# Patient Record
Sex: FEMALE | Race: WHITE | Employment: OTHER | URBAN - METROPOLITAN AREA
[De-identification: names, ages, dates, MRNs, and addresses within clinical notes are randomized per-mention and may not be internally consistent; named-entity substitution may affect disease eponyms.]

---

## 2021-02-17 NOTE — H&P
Thania Mukherjee is a 62year old female. HPI:   Patient presents with:  Physical: Hx partial hysterectomy 2007. Cardiac ablation in 2001. Establish Care: Previous PCP Dr. Tisha Prince at [facility]. Seens Derm yearly Dr. Baylee Jiang.      Ms. Anat Alfaro is a 61 yea • Obesity Mother    • Hypertension Sister    • Diabetes Sister    • Obesity Sister    • Cancer Maternal Grandfather         Basal cell carcinoma   • Heart Disorder Maternal Grandfather    • Other (Other) Sister         RA and SLE      Social History: Soc acute distress. Alert and oriented x 3.   Eyes: EOMI, PERRLA, clear sclera b/l  HENT: NCAT, Moist mucous membranes, Oropharynx without erythema or exudates  Neck: No JVD, no thyromegaly  Cardiovascular: S1, S2, no S3, no S4, Regular rate and rhythm, No murm steroid   use. Today’s DEXA justifies labelling the patient as having Normal Bone Density   BMD (based on the T score result(s) for the femoral neck, lumbar spine and   total hip.     Using the patient Questionnaire and the FRAX web site, the calculated the defined types were placed in this encounter.       Meds This Visit:  Requested Prescriptions      No prescriptions requested or ordered in this encounter       Imaging & Referrals:  None       Health Maintenance  HTN Screen: At goal  DM Screen: Check fa

## 2021-02-17 NOTE — PATIENT INSTRUCTIONS
- You were seen in clinic for regular annual check-up. We have reviewed the last set of blood work at [facility].   No need to repeat any blood work until later this year (September 2021)  -Please obtain the records of your last colonoscopy and send them through

## 2021-02-22 ENCOUNTER — OFFICE VISIT (OUTPATIENT)
Dept: INTERNAL MEDICINE CLINIC | Facility: CLINIC | Age: 61
End: 2021-02-22
Payer: COMMERCIAL

## 2021-02-22 VITALS
TEMPERATURE: 98 F | SYSTOLIC BLOOD PRESSURE: 118 MMHG | HEART RATE: 76 BPM | WEIGHT: 153 LBS | OXYGEN SATURATION: 98 % | DIASTOLIC BLOOD PRESSURE: 82 MMHG | HEIGHT: 63.8 IN | BODY MASS INDEX: 26.44 KG/M2

## 2021-02-22 DIAGNOSIS — D17.21 LIPOMA OF RIGHT UPPER EXTREMITY: ICD-10-CM

## 2021-02-22 DIAGNOSIS — Z80.8 FAMILY HISTORY OF SKIN CANCER: ICD-10-CM

## 2021-02-22 DIAGNOSIS — Z00.00 ROUTINE GENERAL MEDICAL EXAMINATION AT A HEALTH CARE FACILITY: Primary | ICD-10-CM

## 2021-02-22 PROCEDURE — 3074F SYST BP LT 130 MM HG: CPT | Performed by: INTERNAL MEDICINE

## 2021-02-22 PROCEDURE — 3079F DIAST BP 80-89 MM HG: CPT | Performed by: INTERNAL MEDICINE

## 2021-02-22 PROCEDURE — 99386 PREV VISIT NEW AGE 40-64: CPT | Performed by: INTERNAL MEDICINE

## 2021-02-22 PROCEDURE — 3008F BODY MASS INDEX DOCD: CPT | Performed by: INTERNAL MEDICINE

## 2021-03-02 ENCOUNTER — PATIENT MESSAGE (OUTPATIENT)
Dept: INTERNAL MEDICINE CLINIC | Facility: CLINIC | Age: 61
End: 2021-03-02

## 2021-03-02 NOTE — TELEPHONE ENCOUNTER
[employer] message responded, I reviewed the colonoscopy report from 2016. In my out box for scanning to the chart.

## 2021-03-02 NOTE — TELEPHONE ENCOUNTER
From: Estefani Diamond  To: Mildred Sharma MD  Sent: 3/2/2021 10:38 AM CST  Subject: Other    Good Morning! I attached the colonoscopy results that you had asked I send to you. Have a good day!   Carlos Duarte

## 2021-11-07 NOTE — PATIENT INSTRUCTIONS
You were seen in clinic for follow-up. Today, we focused on general checkup. We are happy to hear you are doing well since our last visit. Please make an appointment with dermatology, Dr. Ovi Campos. Continue your annual skin checks with dermatology.     P

## 2021-11-07 NOTE — PROGRESS NOTES
Chief Complaint:   Patient presents with:  Checkup: flu shot given today, pt requesting order for screening mammo and referral to dermatologist for annual skin exam.        HPI:     Ms. Durga Hawley is a 62year old female PMHX SVT, family history of skin cancer BOLD    Constitutional: Fever, Chills, Weight Gain, Weight Loss, Night Sweats, Fatigue, Malaise  ENT/Mouth:  Hearing Changes, Ear Pain, Nasal Congestion, Sinus Pain, Hoarseness, Sore throat, Rhinorrhea, Swallowing Difficulty  Eyes: Eye Pain, Swelling, Redn tenderness bilaterally  Neurologic: No focal neurological deficits, CN II-XII intact, light touch intact, MSK Strength 5/5 and symmetric in all extremities, normal gait  Musculoskeletal: Full range of motion of all extremities, no clubbing/swelling/edema AND PLAN:       Ms. Saira Rucker is a 62year old female PMHX SVT, family history of skin cancer who presents today for follow-up.     History of SVT  Ablation 2001 without recurrence.   Remains asymptomatic  –Continue to monitor     Family history of skin cancer

## 2021-11-08 ENCOUNTER — OFFICE VISIT (OUTPATIENT)
Dept: INTERNAL MEDICINE CLINIC | Facility: CLINIC | Age: 61
End: 2021-11-08
Payer: COMMERCIAL

## 2021-11-08 VITALS
OXYGEN SATURATION: 98 % | TEMPERATURE: 98 F | HEIGHT: 63.8 IN | HEART RATE: 66 BPM | WEIGHT: 154 LBS | SYSTOLIC BLOOD PRESSURE: 126 MMHG | BODY MASS INDEX: 26.62 KG/M2 | DIASTOLIC BLOOD PRESSURE: 66 MMHG

## 2021-11-08 DIAGNOSIS — Z12.31 ENCOUNTER FOR SCREENING MAMMOGRAM FOR MALIGNANT NEOPLASM OF BREAST: Primary | ICD-10-CM

## 2021-11-08 DIAGNOSIS — Z80.8 FAMILY HISTORY OF SKIN CANCER: ICD-10-CM

## 2021-11-08 PROCEDURE — 90471 IMMUNIZATION ADMIN: CPT | Performed by: INTERNAL MEDICINE

## 2021-11-08 PROCEDURE — 99213 OFFICE O/P EST LOW 20 MIN: CPT | Performed by: INTERNAL MEDICINE

## 2021-11-08 PROCEDURE — 3078F DIAST BP <80 MM HG: CPT | Performed by: INTERNAL MEDICINE

## 2021-11-08 PROCEDURE — 3074F SYST BP LT 130 MM HG: CPT | Performed by: INTERNAL MEDICINE

## 2021-11-08 PROCEDURE — 90686 IIV4 VACC NO PRSV 0.5 ML IM: CPT | Performed by: INTERNAL MEDICINE

## 2021-11-08 PROCEDURE — 3008F BODY MASS INDEX DOCD: CPT | Performed by: INTERNAL MEDICINE

## 2021-11-09 ENCOUNTER — PATIENT MESSAGE (OUTPATIENT)
Dept: INTERNAL MEDICINE CLINIC | Facility: CLINIC | Age: 61
End: 2021-11-09

## 2021-11-09 PROBLEM — D17.21 LIPOMA OF RIGHT UPPER EXTREMITY: Status: RESOLVED | Noted: 2021-02-22 | Resolved: 2021-11-09

## 2021-11-09 NOTE — TELEPHONE ENCOUNTER
From: Jim Yao  To: Hua Martino MD  Sent: 11/9/2021 3:06 PM CST  Subject: Error in chart    I noticed an error on the After Visit Summary and Dermatology Referral. It states I have a lipoma of the right upper extremity.  Also states it was one of the
Office visit and referral addended per request.  [address] message sent.
To Dr. Arlen Watkins to please advise----
temporal

## 2021-11-09 NOTE — TELEPHONE ENCOUNTER
From: Dorota Mccrary  Sent: 11/9/2021 4:40 PM CST  To: [facility] Clinical Staff  Subject: Error in chart    Thank you!

## 2022-01-12 ENCOUNTER — HOSPITAL ENCOUNTER (OUTPATIENT)
Dept: MAMMOGRAPHY | Facility: HOSPITAL | Age: 62
Discharge: HOME | End: 2022-01-12
Attending: INTERNAL MEDICINE
Payer: COMMERCIAL

## 2022-01-12 ENCOUNTER — TELEPHONE (OUTPATIENT)
Dept: INTERNAL MEDICINE CLINIC | Facility: CLINIC | Age: 62
End: 2022-01-12

## 2022-01-12 DIAGNOSIS — Z12.31 ENCOUNTER FOR SCREENING MAMMOGRAM FOR MALIGNANT NEOPLASM OF BREAST: ICD-10-CM

## 2022-01-12 PROCEDURE — 77067 SCR MAMMO BI INCL CAD: CPT | Performed by: INTERNAL MEDICINE

## 2022-01-12 PROCEDURE — 77063 BREAST TOMOSYNTHESIS BI: CPT | Performed by: INTERNAL MEDICINE

## 2022-01-12 NOTE — TELEPHONE ENCOUNTER
Sent a [employer] message regarding the mammogram results. Repeat mammogram in 1 year with clinical breast exam.    Qpynt message sent.

## 2022-02-21 ENCOUNTER — OFFICE VISIT (OUTPATIENT)
Dept: DERMATOLOGY CLINIC | Facility: CLINIC | Age: 62
End: 2022-02-21
Payer: COMMERCIAL

## 2022-02-21 DIAGNOSIS — D23.30 BENIGN NEOPLASM OF SKIN OF FACE: ICD-10-CM

## 2022-02-21 DIAGNOSIS — L57.0 AK (ACTINIC KERATOSIS): Primary | ICD-10-CM

## 2022-02-21 DIAGNOSIS — D23.60 BENIGN NEOPLASM OF SKIN OF UPPER LIMB, INCLUDING SHOULDER, UNSPECIFIED LATERALITY: ICD-10-CM

## 2022-02-21 DIAGNOSIS — D23.4 BENIGN NEOPLASM OF SCALP AND SKIN OF NECK: ICD-10-CM

## 2022-02-21 DIAGNOSIS — D23.70 BENIGN NEOPLASM OF SKIN OF LOWER LIMB, INCLUDING HIP, UNSPECIFIED LATERALITY: ICD-10-CM

## 2022-02-21 DIAGNOSIS — Z12.83 SKIN CANCER SCREENING: ICD-10-CM

## 2022-02-21 DIAGNOSIS — D23.5 BENIGN NEOPLASM OF SKIN OF TRUNK, EXCEPT SCROTUM: ICD-10-CM

## 2022-02-21 DIAGNOSIS — L81.4 LENTIGO: ICD-10-CM

## 2022-02-21 PROCEDURE — 99203 OFFICE O/P NEW LOW 30 MIN: CPT | Performed by: DERMATOLOGY

## 2022-02-21 PROCEDURE — 17003 DESTRUCT PREMALG LES 2-14: CPT | Performed by: DERMATOLOGY

## 2022-02-21 PROCEDURE — 17000 DESTRUCT PREMALG LESION: CPT | Performed by: DERMATOLOGY

## 2022-12-30 ENCOUNTER — TELEPHONE (OUTPATIENT)
Dept: INTERNAL MEDICINE CLINIC | Facility: CLINIC | Age: 62
End: 2022-12-30

## 2022-12-30 DIAGNOSIS — Z12.31 ENCOUNTER FOR SCREENING MAMMOGRAM FOR MALIGNANT NEOPLASM OF BREAST: Primary | ICD-10-CM

## 2022-12-30 NOTE — TELEPHONE ENCOUNTER
Pt requesting order for mammogram  Please notify patient via [email or web address] when order entered  Tasked to nursing

## 2023-01-15 NOTE — PATIENT INSTRUCTIONS
- You were seen in clinic for regular annual check-up. We have ordered labs for you and we will call you with the results. Please obtain the bloodwork fasting for 12 hours. OK to drink water the day of your blood draw  -We will follow-up on the results of your mammogram later this month, continue with home self checks  - Continue with periodic checking of your skin with family history of skin conditions. We will refer you to Dr. Rubi Kent of Dermatology for 1-2 whole body skin checks  -Your next colonoscopy will be 2025  - Your next Pneumonia shot/Prevnar 21 will be at age 73  - Please continue to eat a varied diet including recommended servings of vegetables, fruits, and low fat dairy. Minimize high saturated fats (such as fast foods) and high sugar intake (such as soda)  - We recommend 150 minutes of moderate intensity exercise (brisk walking, swimming) weekly to maintain your current weight. Targeted weight loss will require more vigorous exercise or more than 150 minutes/week. Return to clinic in 6 months for follow-up or 1 year for annual physical examination    Keep up the good work with nutrition, exercise, stress coping mechanisms. Lets maintain this excellent state of health moving forward and staying up-to-date with your health maintenance recommendations. Happy birthday this upcoming weekend!

## 2023-01-17 ENCOUNTER — OFFICE VISIT (OUTPATIENT)
Dept: INTERNAL MEDICINE CLINIC | Facility: CLINIC | Age: 63
End: 2023-01-17

## 2023-01-17 ENCOUNTER — LAB ENCOUNTER (OUTPATIENT)
Dept: LAB | Age: 63
End: 2023-01-17
Attending: INTERNAL MEDICINE
Payer: COMMERCIAL

## 2023-01-17 VITALS
DIASTOLIC BLOOD PRESSURE: 80 MMHG | HEART RATE: 68 BPM | OXYGEN SATURATION: 96 % | TEMPERATURE: 98 F | HEIGHT: 63 IN | BODY MASS INDEX: 28.35 KG/M2 | WEIGHT: 160 LBS | SYSTOLIC BLOOD PRESSURE: 118 MMHG

## 2023-01-17 DIAGNOSIS — I47.1 SVT (SUPRAVENTRICULAR TACHYCARDIA) (HCC): ICD-10-CM

## 2023-01-17 DIAGNOSIS — E55.9 VITAMIN D DEFICIENCY: ICD-10-CM

## 2023-01-17 DIAGNOSIS — Z13.1 SCREENING FOR DIABETES MELLITUS: ICD-10-CM

## 2023-01-17 DIAGNOSIS — Z00.00 ANNUAL PHYSICAL EXAM: Primary | ICD-10-CM

## 2023-01-17 DIAGNOSIS — Z13.220 SCREENING FOR LIPOID DISORDERS: ICD-10-CM

## 2023-01-17 DIAGNOSIS — Z13.0 SCREENING FOR DEFICIENCY ANEMIA: ICD-10-CM

## 2023-01-17 DIAGNOSIS — Z80.8 FAMILY HISTORY OF SKIN CANCER: ICD-10-CM

## 2023-01-17 DIAGNOSIS — Z13.29 SCREENING FOR THYROID DISORDER: ICD-10-CM

## 2023-01-17 LAB
ALBUMIN SERPL-MCNC: 3.9 G/DL (ref 3.4–5)
ALBUMIN/GLOB SERPL: 1.3 {RATIO} (ref 1–2)
ALP LIVER SERPL-CCNC: 52 U/L
ALT SERPL-CCNC: 22 U/L
ANION GAP SERPL CALC-SCNC: 2 MMOL/L (ref 0–18)
AST SERPL-CCNC: 14 U/L (ref 15–37)
BASOPHILS # BLD AUTO: 0.02 X10(3) UL (ref 0–0.2)
BASOPHILS NFR BLD AUTO: 0.6 %
BILIRUB SERPL-MCNC: 0.5 MG/DL (ref 0.1–2)
BUN BLD-MCNC: 9 MG/DL (ref 7–18)
BUN/CREAT SERPL: 12.3 (ref 10–20)
CALCIUM BLD-MCNC: 9.1 MG/DL (ref 8.5–10.1)
CHLORIDE SERPL-SCNC: 104 MMOL/L (ref 98–112)
CHOLEST SERPL-MCNC: 186 MG/DL (ref ?–200)
CO2 SERPL-SCNC: 31 MMOL/L (ref 21–32)
CREAT BLD-MCNC: 0.73 MG/DL
DEPRECATED RDW RBC AUTO: 43.3 FL (ref 35.1–46.3)
EOSINOPHIL # BLD AUTO: 0.01 X10(3) UL (ref 0–0.7)
EOSINOPHIL NFR BLD AUTO: 0.3 %
ERYTHROCYTE [DISTWIDTH] IN BLOOD BY AUTOMATED COUNT: 11.9 % (ref 11–15)
FASTING PATIENT LIPID ANSWER: YES
FASTING STATUS PATIENT QL REPORTED: YES
GFR SERPLBLD BASED ON 1.73 SQ M-ARVRAT: 94 ML/MIN/1.73M2 (ref 60–?)
GLOBULIN PLAS-MCNC: 3 G/DL (ref 2.8–4.4)
GLUCOSE BLD-MCNC: 93 MG/DL (ref 70–99)
HCT VFR BLD AUTO: 41 %
HDLC SERPL-MCNC: 106 MG/DL (ref 40–59)
HGB BLD-MCNC: 13.6 G/DL
IMM GRANULOCYTES # BLD AUTO: 0 X10(3) UL (ref 0–1)
IMM GRANULOCYTES NFR BLD: 0 %
LDLC SERPL CALC-MCNC: 69 MG/DL (ref ?–100)
LYMPHOCYTES # BLD AUTO: 0.94 X10(3) UL (ref 1–4)
LYMPHOCYTES NFR BLD AUTO: 28.2 %
MCH RBC QN AUTO: 32.9 PG (ref 26–34)
MCHC RBC AUTO-ENTMCNC: 33.2 G/DL (ref 31–37)
MCV RBC AUTO: 99.3 FL
MONOCYTES # BLD AUTO: 0.32 X10(3) UL (ref 0.1–1)
MONOCYTES NFR BLD AUTO: 9.6 %
NEUTROPHILS # BLD AUTO: 2.04 X10 (3) UL (ref 1.5–7.7)
NEUTROPHILS # BLD AUTO: 2.04 X10(3) UL (ref 1.5–7.7)
NEUTROPHILS NFR BLD AUTO: 61.3 %
NONHDLC SERPL-MCNC: 80 MG/DL (ref ?–130)
OSMOLALITY SERPL CALC.SUM OF ELEC: 282 MOSM/KG (ref 275–295)
PLATELET # BLD AUTO: 232 10(3)UL (ref 150–450)
POTASSIUM SERPL-SCNC: 4.6 MMOL/L (ref 3.5–5.1)
PROT SERPL-MCNC: 6.9 G/DL (ref 6.4–8.2)
RBC # BLD AUTO: 4.13 X10(6)UL
SODIUM SERPL-SCNC: 137 MMOL/L (ref 136–145)
TRIGL SERPL-MCNC: 57 MG/DL (ref 30–149)
TSI SER-ACNC: 1.29 MIU/ML (ref 0.36–3.74)
VIT D+METAB SERPL-MCNC: 24.6 NG/ML (ref 30–100)
VLDLC SERPL CALC-MCNC: 9 MG/DL (ref 0–30)
WBC # BLD AUTO: 3.3 X10(3) UL (ref 4–11)

## 2023-01-17 PROCEDURE — 82306 VITAMIN D 25 HYDROXY: CPT

## 2023-01-17 PROCEDURE — 3074F SYST BP LT 130 MM HG: CPT | Performed by: INTERNAL MEDICINE

## 2023-01-17 PROCEDURE — 80061 LIPID PANEL: CPT | Performed by: INTERNAL MEDICINE

## 2023-01-17 PROCEDURE — 3079F DIAST BP 80-89 MM HG: CPT | Performed by: INTERNAL MEDICINE

## 2023-01-17 PROCEDURE — 90715 TDAP VACCINE 7 YRS/> IM: CPT | Performed by: INTERNAL MEDICINE

## 2023-01-17 PROCEDURE — 85025 COMPLETE CBC W/AUTO DIFF WBC: CPT | Performed by: INTERNAL MEDICINE

## 2023-01-17 PROCEDURE — 3008F BODY MASS INDEX DOCD: CPT | Performed by: INTERNAL MEDICINE

## 2023-01-17 PROCEDURE — 84443 ASSAY THYROID STIM HORMONE: CPT | Performed by: INTERNAL MEDICINE

## 2023-01-17 PROCEDURE — 90471 IMMUNIZATION ADMIN: CPT | Performed by: INTERNAL MEDICINE

## 2023-01-17 PROCEDURE — 99396 PREV VISIT EST AGE 40-64: CPT | Performed by: INTERNAL MEDICINE

## 2023-01-17 PROCEDURE — 80053 COMPREHEN METABOLIC PANEL: CPT | Performed by: INTERNAL MEDICINE

## 2023-01-17 PROCEDURE — 36415 COLL VENOUS BLD VENIPUNCTURE: CPT | Performed by: INTERNAL MEDICINE

## 2023-01-19 ENCOUNTER — TELEPHONE (OUTPATIENT)
Dept: INTERNAL MEDICINE CLINIC | Facility: CLINIC | Age: 63
End: 2023-01-19

## 2023-01-19 NOTE — TELEPHONE ENCOUNTER
Please notify patient that I reviewed the blood work from 1/17    Labs  Cholesterol is well controlled  Vitamin D is slightly low 24.6  All other lab tests look good    Recommendations  We will follow-up on the mammogram once completed next week  Would recommend vitamin D supplementation vitamin D2 over-the-counter 2000 units once a day  No other recommendations for now, keep up the good work with nutrition and exercise

## 2023-01-24 ENCOUNTER — PATIENT MESSAGE (OUTPATIENT)
Dept: INTERNAL MEDICINE CLINIC | Facility: CLINIC | Age: 63
End: 2023-01-24

## 2023-01-24 DIAGNOSIS — Z80.8 FAMILY HISTORY OF MELANOMA: Primary | ICD-10-CM

## 2023-01-24 NOTE — TELEPHONE ENCOUNTER
From: Randy July  To: Armin Slater MD  Sent: 1/24/2023 12:14 PM CST  Subject: Derm Referral Change Needed    Hello! I need to have my referral to Dermatology changed because Dr. Aurora Moctezuma no longer takes my insurance. I will go back to Dr. Kym Fulton. I checked with [facility] Shell and she is still in network. Please place a new referral for me. Thank you!

## 2023-01-27 ENCOUNTER — PATIENT MESSAGE (OUTPATIENT)
Dept: INTERNAL MEDICINE CLINIC | Facility: CLINIC | Age: 63
End: 2023-01-27

## 2023-01-28 ENCOUNTER — HOSPITAL ENCOUNTER (OUTPATIENT)
Dept: MAMMOGRAPHY | Facility: HOSPITAL | Age: 63
Discharge: HOME | End: 2023-01-28
Attending: INTERNAL MEDICINE
Payer: COMMERCIAL

## 2023-01-28 DIAGNOSIS — Z12.31 ENCOUNTER FOR SCREENING MAMMOGRAM FOR MALIGNANT NEOPLASM OF BREAST: ICD-10-CM

## 2023-01-28 PROCEDURE — 77067 SCR MAMMO BI INCL CAD: CPT | Performed by: INTERNAL MEDICINE

## 2023-01-28 PROCEDURE — 77063 BREAST TOMOSYNTHESIS BI: CPT | Performed by: INTERNAL MEDICINE

## 2023-03-08 ENCOUNTER — OFFICE VISIT (OUTPATIENT)
Dept: DERMATOLOGY CLINIC | Facility: CLINIC | Age: 63
End: 2023-03-08

## 2023-03-08 DIAGNOSIS — D23.5 BENIGN NEOPLASM OF SKIN OF TRUNK, EXCEPT SCROTUM: ICD-10-CM

## 2023-03-08 DIAGNOSIS — L81.4 LENTIGO: ICD-10-CM

## 2023-03-08 DIAGNOSIS — D23.70 BENIGN NEOPLASM OF SKIN OF LOWER LIMB, INCLUDING HIP, UNSPECIFIED LATERALITY: ICD-10-CM

## 2023-03-08 DIAGNOSIS — Z12.83 SKIN CANCER SCREENING: ICD-10-CM

## 2023-03-08 DIAGNOSIS — D23.60 BENIGN NEOPLASM OF SKIN OF UPPER LIMB, INCLUDING SHOULDER, UNSPECIFIED LATERALITY: ICD-10-CM

## 2023-03-08 DIAGNOSIS — D23.4 BENIGN NEOPLASM OF SCALP AND SKIN OF NECK: ICD-10-CM

## 2023-03-08 DIAGNOSIS — D23.5 BENIGN NEOPLASM OF SKIN OF TRUNK: ICD-10-CM

## 2023-03-08 DIAGNOSIS — L57.0 AK (ACTINIC KERATOSIS): Primary | ICD-10-CM

## 2023-03-08 DIAGNOSIS — D23.30 BENIGN NEOPLASM OF SKIN OF FACE: ICD-10-CM

## 2023-03-08 RX ORDER — MULTIVIT-MIN/IRON/FOLIC ACID/K 18-600-40
CAPSULE ORAL
COMMUNITY

## 2023-08-28 ENCOUNTER — OFFICE VISIT (OUTPATIENT)
Dept: DERMATOLOGY CLINIC | Facility: CLINIC | Age: 63
End: 2023-08-28

## 2023-08-28 DIAGNOSIS — L57.0 AK (ACTINIC KERATOSIS): ICD-10-CM

## 2023-08-28 DIAGNOSIS — D23.60 BENIGN NEOPLASM OF SKIN OF UPPER LIMB, INCLUDING SHOULDER, UNSPECIFIED LATERALITY: ICD-10-CM

## 2023-08-28 DIAGNOSIS — D48.5 NEOPLASM OF UNCERTAIN BEHAVIOR OF SKIN: Primary | ICD-10-CM

## 2023-08-28 DIAGNOSIS — D23.30 BENIGN NEOPLASM OF SKIN OF FACE: ICD-10-CM

## 2023-08-28 DIAGNOSIS — D23.4 BENIGN NEOPLASM OF SCALP AND SKIN OF NECK: ICD-10-CM

## 2023-08-28 DIAGNOSIS — D23.70 BENIGN NEOPLASM OF SKIN OF LOWER LIMB, INCLUDING HIP, UNSPECIFIED LATERALITY: ICD-10-CM

## 2023-08-28 DIAGNOSIS — L81.4 LENTIGO: ICD-10-CM

## 2023-08-28 DIAGNOSIS — D23.5 BENIGN NEOPLASM OF SKIN OF TRUNK: ICD-10-CM

## 2023-08-28 PROCEDURE — 88305 TISSUE EXAM BY PATHOLOGIST: CPT | Performed by: DERMATOLOGY

## 2023-08-31 ENCOUNTER — PATIENT MESSAGE (OUTPATIENT)
Dept: DERMATOLOGY CLINIC | Facility: CLINIC | Age: 63
End: 2023-08-31

## 2023-08-31 ENCOUNTER — TELEPHONE (OUTPATIENT)
Dept: DERMATOLOGY CLINIC | Facility: CLINIC | Age: 63
End: 2023-08-31

## 2023-08-31 ENCOUNTER — TELEPHONE (OUTPATIENT)
Dept: INTERNAL MEDICINE CLINIC | Facility: CLINIC | Age: 63
End: 2023-08-31

## 2023-08-31 DIAGNOSIS — C44.92 SCC (SQUAMOUS CELL CARCINOMA): Primary | ICD-10-CM

## 2023-08-31 DIAGNOSIS — D04.9 SQUAMOUS CELL CARCINOMA IN SITU (SCCIS) OF SKIN: Primary | ICD-10-CM

## 2023-09-05 ENCOUNTER — PATIENT MESSAGE (OUTPATIENT)
Dept: INTERNAL MEDICINE CLINIC | Facility: CLINIC | Age: 63
End: 2023-09-05

## 2023-09-05 DIAGNOSIS — C44.729 SQUAMOUS CELL CARCINOMA OF SKIN OF LEFT LOWER LIMB, INCLUDING HIP: Primary | ICD-10-CM

## 2023-09-05 NOTE — TELEPHONE ENCOUNTER
To managed care (Grisel Jarrett): can below CPT codes and Dx code be added to existing referral placed 8/31/23?    referral # [id number]

## 2023-09-05 NOTE — TELEPHONE ENCOUNTER
From: Awa Davenport  To: Harpal Fowler MD  Sent: 9/5/2023 11:59 AM CDT  Subject: Referral to Dr. Tito Rivera additional info needed    Hello,    The referral to Dr. Tito Rivera needs the following diagnosis and CPT Codes added to it and then faxed to them at [phone]:    Diagnosis Code: C44.729    CPT Codes: [address], [address] [facility] [address] [facility], [address] [facility] [address], [id number], [address] [place] [address]    Thank you,  Richard Ahr

## 2023-09-07 NOTE — TELEPHONE ENCOUNTER
Hello     We get authorization for additional visits.  The specialist should obtain auth for anything additional.     Thank you,  [facility]  Referral specialist

## 2023-09-10 NOTE — TELEPHONE ENCOUNTER
Not sure how to proceed with this. Reordered referral for requested codes? Can we touch base with [facility] on this?     [employer] Message sent

## 2023-10-12 ENCOUNTER — PATIENT MESSAGE (OUTPATIENT)
Dept: INTERNAL MEDICINE CLINIC | Facility: CLINIC | Age: 63
End: 2023-10-12

## 2023-11-15 ENCOUNTER — MED REC SCAN ONLY (OUTPATIENT)
Dept: DERMATOLOGY CLINIC | Facility: CLINIC | Age: 63
End: 2023-11-15

## 2024-01-31 ENCOUNTER — TELEPHONE (OUTPATIENT)
Dept: INTERNAL MEDICINE CLINIC | Facility: CLINIC | Age: 64
End: 2024-01-31

## 2024-02-15 ENCOUNTER — TELEPHONE (OUTPATIENT)
Dept: INTERNAL MEDICINE CLINIC | Facility: CLINIC | Age: 64
End: 2024-02-15

## 2024-02-15 DIAGNOSIS — Z12.83 SKIN CANCER SCREENING: ICD-10-CM

## 2024-02-15 DIAGNOSIS — E55.9 VITAMIN D DEFICIENCY: ICD-10-CM

## 2024-02-15 DIAGNOSIS — C44.729 SQUAMOUS CELL CARCINOMA OF SKIN OF LEFT LOWER LIMB, INCLUDING HIP: ICD-10-CM

## 2024-02-15 DIAGNOSIS — Z12.31 ENCOUNTER FOR SCREENING MAMMOGRAM FOR MALIGNANT NEOPLASM OF BREAST: Primary | ICD-10-CM

## 2024-02-15 DIAGNOSIS — Z13.29 SCREENING FOR THYROID DISORDER: ICD-10-CM

## 2024-02-15 DIAGNOSIS — Z00.00 ANNUAL PHYSICAL EXAM: ICD-10-CM

## 2024-02-15 NOTE — TELEPHONE ENCOUNTER
To Dr. CHASE--please review/advise on pending screening mammogram, annual lab orders, and derm referral.

## 2024-02-15 NOTE — TELEPHONE ENCOUNTER
Spoke with patient to re schedule her 3/28 physical with Dr Langston   New appointment date is 5/17/24    Pt asking for mammogram order and referral to see dermatologist, Dr Mary Escobedo, for her annual check up     [facility]    Confirm to patient via my chart

## 2024-02-22 ENCOUNTER — HOSPITAL ENCOUNTER (OUTPATIENT)
Dept: MAMMOGRAPHY | Facility: HOSPITAL | Age: 64
Discharge: HOME | End: 2024-02-22
Attending: INTERNAL MEDICINE
Payer: COMMERCIAL

## 2024-02-22 DIAGNOSIS — Z12.31 ENCOUNTER FOR SCREENING MAMMOGRAM FOR MALIGNANT NEOPLASM OF BREAST: ICD-10-CM

## 2024-02-22 PROCEDURE — 77063 BREAST TOMOSYNTHESIS BI: CPT | Performed by: INTERNAL MEDICINE

## 2024-02-22 PROCEDURE — 77067 SCR MAMMO BI INCL CAD: CPT | Performed by: INTERNAL MEDICINE

## 2024-02-26 ENCOUNTER — TELEPHONE (OUTPATIENT)
Dept: INTERNAL MEDICINE CLINIC | Facility: CLINIC | Age: 64
End: 2024-02-26

## 2024-02-27 NOTE — TELEPHONE ENCOUNTER
Please notify the patient that her mammogram came back normal, repeat mammogram and breast exam recommended in 1 year.

## 2024-03-22 ENCOUNTER — OFFICE VISIT (OUTPATIENT)
Dept: DERMATOLOGY CLINIC | Facility: CLINIC | Age: 64
End: 2024-03-22
Payer: COMMERCIAL

## 2024-03-22 DIAGNOSIS — Z85.828 HISTORY OF NONMELANOMA SKIN CANCER: ICD-10-CM

## 2024-03-22 DIAGNOSIS — L82.1 SK (SEBORRHEIC KERATOSIS): Primary | ICD-10-CM

## 2024-03-22 DIAGNOSIS — L57.0 AK (ACTINIC KERATOSIS): ICD-10-CM

## 2024-03-22 DIAGNOSIS — L81.4 LENTIGO: ICD-10-CM

## 2024-03-22 DIAGNOSIS — Z08 ENCOUNTER FOR FOLLOW-UP SURVEILLANCE OF SKIN CANCER: ICD-10-CM

## 2024-03-22 DIAGNOSIS — D23.9 BENIGN NEOPLASM OF SKIN, UNSPECIFIED LOCATION: ICD-10-CM

## 2024-03-22 DIAGNOSIS — L72.0 MILIA: ICD-10-CM

## 2024-03-22 DIAGNOSIS — D22.9 MULTIPLE NEVI: ICD-10-CM

## 2024-03-22 DIAGNOSIS — Z85.828 ENCOUNTER FOR FOLLOW-UP SURVEILLANCE OF SKIN CANCER: ICD-10-CM

## 2024-03-22 PROCEDURE — 99213 OFFICE O/P EST LOW 20 MIN: CPT | Performed by: DERMATOLOGY

## 2024-03-22 RX ORDER — AMMONIUM LACTATE 12 G/100G
1 LOTION TOPICAL 2 TIMES DAILY
Qty: 225 ML | Refills: 11 | Status: SHIPPED | OUTPATIENT
Start: 2024-03-22 | End: 2024-04-21

## 2024-03-22 RX ORDER — ADAPALENE 45 G/G
GEL TOPICAL
Qty: 45 G | Refills: 1 | Status: SHIPPED | OUTPATIENT
Start: 2024-03-22

## 2024-04-01 NOTE — PROGRESS NOTES
Yajaira Harding is a 64 year old female.  HPI:     CC:    Chief Complaint   Patient presents with    Full Skin Exam     Patient present for a Full Body Check - LOV 08-28-23 - Patient has a hx of AK's and family hx of Melanoma and BCC - No new spots or areas of concern         Allergies:  Patient has no known allergies.    HISTORY:    Past Medical History:   Diagnosis Date    Paroxysmal SVT (supraventricular tachycardia) 2001    Clinically resolved, underwent cardiac ablation at [facility] in 2001.    SCC (squamous cell carcinoma) 2023    left posterior ankle      Past Surgical History:   Procedure Laterality Date    COLONOSCOPY      HYSTERECTOMY  2007    partial; cervix removed, ovaries are in place bilaterally    OTHER  2001    Cardiac ablation 2001    WISDOM TEETH REMOVED      High school age      Family History   Problem Relation Age of Onset    Cancer Mother         Melanoma    Diabetes Mother     Hypertension Mother     Obesity Mother     Dementia Father         Parkinson's disease    Other (Bullous pemphigoid) Sister         Was in burn unit in [place]    Hypertension Sister     Diabetes Sister     Obesity Sister     Other (Other) Sister         RA and SLE    Cancer Maternal Grandfather         Basal cell carcinoma    Heart Disorder Maternal Grandfather       Social History     Socioeconomic History    Marital status:    Tobacco Use    Smoking status: Never     Passive exposure: Never    Smokeless tobacco: Never   Vaping Use    Vaping Use: Never used   Substance and Sexual Activity    Alcohol use: Yes     Comment: 2-4 drinks per week    Drug use: Never   Other Topics Concern    Grew up on a farm No    History of tanning Yes    Outdoor occupation No    Breast feeding No    Reaction to local anesthetic No    Pt has a pacemaker No    Pt has a defibrillator No        Current Outpatient Medications   Medication Sig Dispense Refill    Adapalene (DIFFERIN) 0.1 % External Gel Use at bedtime to spots  on nose and around eyes 45 g 1    ammonium lactate 12 % External Lotion Apply 1 Application topically 2 (two) times daily for 60 doses. To arms legs back for dry skin/keratoses 225 mL 11    Cholecalciferol (VITAMIN D) 50 MCG (2000 UT) Oral Cap Take by mouth.       Allergies:   No Known Allergies    Past Medical History:   Diagnosis Date    Paroxysmal SVT (supraventricular tachycardia) 2001    Clinically resolved, underwent cardiac ablation at [facility] in 2001.    SCC (squamous cell carcinoma) 2023    left posterior ankle     Past Surgical History:   Procedure Laterality Date    COLONOSCOPY      HYSTERECTOMY  2007    partial; cervix removed, ovaries are in place bilaterally    OTHER  2001    Cardiac ablation 2001    WISDOM TEETH REMOVED      High school age     Social History     Socioeconomic History    Marital status:      Spouse name: Not on file    Number of children: Not on file    Years of education: Not on file    Highest education level: Not on file   Occupational History    Not on file   Tobacco Use    Smoking status: Never     Passive exposure: Never    Smokeless tobacco: Never   Vaping Use    Vaping Use: Never used   Substance and Sexual Activity    Alcohol use: Yes     Comment: 2-4 drinks per week    Drug use: Never    Sexual activity: Not on file   Other Topics Concern    Grew up on a farm No    History of tanning Yes    Outdoor occupation No    Breast feeding No    Reaction to local anesthetic No    Pt has a pacemaker No    Pt has a defibrillator No   Social History Narrative    Not on file     Social Determinants of Health     Financial Resource Strain: Not on file   Food Insecurity: Not on file   Transportation Needs: Not on file   Physical Activity: Not on file   Stress: Not on file   Social Connections: Not on file   Housing Stability: Not on file     Family History   Problem Relation Age of Onset    Cancer Mother         Melanoma    Diabetes Mother     Hypertension Mother      Obesity Mother     Dementia Father         Parkinson's disease    Other (Bullous pemphigoid) Sister         Was in burn unit in [place]    Hypertension Sister     Diabetes Sister     Obesity Sister     Other (Other) Sister         RA and SLE    Cancer Maternal Grandfather         Basal cell carcinoma    Heart Disorder Maternal Grandfather        There were no vitals filed for this visit.    HPI:    Chief Complaint   Patient presents with    Full Skin Exam     Patient present for a Full Body Check - LOV 08-28-23 - Patient has a hx of AK's and family hx of Melanoma and BCC - No new spots or areas of concern       Follow-up patient with history of AK's excessive sun exposure, sun damage.  Recent SCC family history of melanoma in her mother, grandfather history BCC patient with lots of sun exposure in the past lived in [place] for many years in [facility].     Recently returned from a trip to [place] with her son  Monitoring lesion at right upper arm    Patient presents with concerns above.    Patient has been in their usual state of health.      History, medications, allergies reviewed as noted.      ROS:  new relevant systemic complaints as noted       Physical Examination:     Well-developed well-nourished patient alert oriented in no acute distress.  Exam total-body performed, including scalp, head, neck, face,nails, hair, external eyes, including conjunctival mucosa, eyelids, lips external ears, back, chest,/ breasts, axillae,  abdomen, arms, legs, palms.     Multiple light to medium brown, well marginated, uniformly pigmented, macules and papules 6 mm and less scattered on exam. pigmented lesions examined with dermoscopy benign-appearing patterns.     Waxy tannish keratotic papules scattered, cherry-red vascular papules scattered.    See map today's date for lesions noted .      Otherwise remarkable for lesions as noted on map.  See details of examination  See Assessment /Plan for additional history  and physical exam also:    Assessment / plan:    No orders of the defined types were placed in this encounter.      Meds & Refills for this Visit:  Requested Prescriptions     Signed Prescriptions Disp Refills    Adapalene (DIFFERIN) 0.1 % External Gel 45 g 1     Sig: Use at bedtime to spots on nose and around eyes    ammonium lactate 12 % External Lotion 225 mL 11     Sig: Apply 1 Application topically 2 (two) times daily for 60 doses. To arms legs back for dry skin/keratoses         Encounter Diagnoses   Name Primary?    SK (seborrheic keratosis) Yes    Milia     AK (actinic keratosis)     Lentigo     Benign neoplasm of skin, unspecified location     Multiple nevi     History of nonmelanoma skin cancer     Encounter for follow-up surveillance of skin cancer        See details on map.      Remarkable for:    Extensive keratoses reassurance.    Adapalene gel for milia more numerous lesions over the central face     left posterior ankle, shave biopsy:  -Well-differentiated squamous cell carcinoma 8/23 no recurrence    Actinic Keratoses.  Precancerous nature discussed. Sun protection, sunscreen/ blocks encouraged .  Monitoring for new lesions.  Sun damage additional recurrent and new actinic keratoses, skin cancers may occur in areas of prior actinic keratoses, related to past sun exposure to minimize current sun exposure.  Sunscreen applied consistently regularly, reapplication and sun protection while driving recommended.      Numerous lentigines generalized.  Lesions over the face as well as neck chest arms.  Discussed retinol, sunscreen.    Extensive lentigines, moderate sun damage  Waxy tan keratotic papules lesions in areas of concern as noted reassurance given.  Benign nature discussed.  Possibility of cryo, alphahydroxy acids over-the-counter retinol's discussed.    Benign nevi multiple nevi no other atypical appearing lesions at this time  Medium brown macule at right lateral arm superior lesion on map  observed.  Benign pattern on dermoscopy borders regular.  There is slight variation in central pigment network we will recheck in 6 months.  Smaller lesion benign  Given family history of melanoma, nonmelanoma skin cancer careful follow-up, regularly every 6 months  Exam otherwise unchanged    Recheck 6 months or as needed    Please refer to map for specific lesions.  See additional diagnoses.  Pros cons of various therapies, risks benefits discussed.Pathophysiology discussed with patient.  Therapeutic options reviewed.  See  Medications in grid.  Instructions reviewed at length.    Benign nevi, seborrheic  keratoses, cherry angiomas:  Reassurance regarding other benign skin lesions.Signs and symptoms of skin cancer, ABCDE's of melanoma discussed with patient. Sunscreen use, sun protection, self exams reviewed.  Followup as noted RTC routine checkup 6 mos - one year or p.r.n.    Encounter Times Including precharting, reviewing chart, prior notes obtaining history: 10 minutes, medical exam :10 minutes, notes on body map, plan, counseling 10minutes My total time spent caring for the patient on the day of the encounter: 30 minutes     The patient indicates understanding of these issues and agrees to the plan.  The patient is asked to return as noted in follow-up/ above.    This note was generated using Dragon voice recognition software.  Please contact me regarding any confusion resulting from errors in recognition.

## 2024-05-07 ENCOUNTER — LAB ENCOUNTER (OUTPATIENT)
Dept: LAB | Age: 64
End: 2024-05-07
Attending: INTERNAL MEDICINE
Payer: COMMERCIAL

## 2024-05-07 DIAGNOSIS — E55.9 VITAMIN D DEFICIENCY: ICD-10-CM

## 2024-05-07 DIAGNOSIS — Z00.00 ANNUAL PHYSICAL EXAM: ICD-10-CM

## 2024-05-07 LAB
ALBUMIN SERPL-MCNC: 4 G/DL (ref 3.4–5)
ALBUMIN/GLOB SERPL: 1.4 {RATIO} (ref 1–2)
ALP LIVER SERPL-CCNC: 48 U/L
ALT SERPL-CCNC: 19 U/L
ANION GAP SERPL CALC-SCNC: 6 MMOL/L (ref 0–18)
AST SERPL-CCNC: 16 U/L (ref 15–37)
BASOPHILS # BLD AUTO: 0.03 X10(3) UL (ref 0–0.2)
BASOPHILS NFR BLD AUTO: 0.7 %
BILIRUB SERPL-MCNC: 0.5 MG/DL (ref 0.1–2)
BUN BLD-MCNC: 8 MG/DL (ref 9–23)
CALCIUM BLD-MCNC: 8.8 MG/DL (ref 8.5–10.1)
CHLORIDE SERPL-SCNC: 103 MMOL/L (ref 98–112)
CHOLEST SERPL-MCNC: 203 MG/DL (ref ?–200)
CO2 SERPL-SCNC: 28 MMOL/L (ref 21–32)
CREAT BLD-MCNC: 0.73 MG/DL
EGFRCR SERPLBLD CKD-EPI 2021: 92 ML/MIN/1.73M2 (ref 60–?)
EOSINOPHIL # BLD AUTO: 0.03 X10(3) UL (ref 0–0.7)
EOSINOPHIL NFR BLD AUTO: 0.7 %
ERYTHROCYTE [DISTWIDTH] IN BLOOD BY AUTOMATED COUNT: 12.1 %
FASTING PATIENT LIPID ANSWER: YES
FASTING STATUS PATIENT QL REPORTED: YES
GLOBULIN PLAS-MCNC: 2.9 G/DL (ref 2.8–4.4)
GLUCOSE BLD-MCNC: 93 MG/DL (ref 70–99)
HCT VFR BLD AUTO: 39.1 %
HDLC SERPL-MCNC: 105 MG/DL (ref 40–59)
HGB BLD-MCNC: 13.2 G/DL
IMM GRANULOCYTES # BLD AUTO: 0 X10(3) UL (ref 0–1)
IMM GRANULOCYTES NFR BLD: 0 %
LDLC SERPL CALC-MCNC: 90 MG/DL (ref ?–100)
LYMPHOCYTES # BLD AUTO: 1.1 X10(3) UL (ref 1–4)
LYMPHOCYTES NFR BLD AUTO: 25.1 %
MCH RBC QN AUTO: 32.9 PG (ref 26–34)
MCHC RBC AUTO-ENTMCNC: 33.8 G/DL (ref 31–37)
MCV RBC AUTO: 97.5 FL
MONOCYTES # BLD AUTO: 0.42 X10(3) UL (ref 0.1–1)
MONOCYTES NFR BLD AUTO: 9.6 %
NEUTROPHILS # BLD AUTO: 2.8 X10 (3) UL (ref 1.5–7.7)
NEUTROPHILS # BLD AUTO: 2.8 X10(3) UL (ref 1.5–7.7)
NEUTROPHILS NFR BLD AUTO: 63.9 %
NONHDLC SERPL-MCNC: 98 MG/DL (ref ?–130)
OSMOLALITY SERPL CALC.SUM OF ELEC: 282 MOSM/KG (ref 275–295)
PLATELET # BLD AUTO: 240 10(3)UL (ref 150–450)
POTASSIUM SERPL-SCNC: 4 MMOL/L (ref 3.5–5.1)
PROT SERPL-MCNC: 6.9 G/DL (ref 6.4–8.2)
RBC # BLD AUTO: 4.01 X10(6)UL
SODIUM SERPL-SCNC: 137 MMOL/L (ref 136–145)
TRIGL SERPL-MCNC: 40 MG/DL (ref 30–149)
TSI SER-ACNC: 1.39 MIU/ML (ref 0.36–3.74)
VIT D+METAB SERPL-MCNC: 27.3 NG/ML (ref 30–100)
VLDLC SERPL CALC-MCNC: 6 MG/DL (ref 0–30)
WBC # BLD AUTO: 4.4 X10(3) UL (ref 4–11)

## 2024-05-07 PROCEDURE — 84443 ASSAY THYROID STIM HORMONE: CPT

## 2024-05-07 PROCEDURE — 80061 LIPID PANEL: CPT

## 2024-05-07 PROCEDURE — 80053 COMPREHEN METABOLIC PANEL: CPT

## 2024-05-07 PROCEDURE — 82306 VITAMIN D 25 HYDROXY: CPT

## 2024-05-07 PROCEDURE — 85025 COMPLETE CBC W/AUTO DIFF WBC: CPT

## 2024-05-17 ENCOUNTER — OFFICE VISIT (OUTPATIENT)
Dept: INTERNAL MEDICINE CLINIC | Facility: CLINIC | Age: 64
End: 2024-05-17

## 2024-05-17 VITALS
SYSTOLIC BLOOD PRESSURE: 104 MMHG | OXYGEN SATURATION: 98 % | DIASTOLIC BLOOD PRESSURE: 62 MMHG | HEART RATE: 68 BPM | TEMPERATURE: 98 F | WEIGHT: 152.38 LBS | BODY MASS INDEX: 26.34 KG/M2 | HEIGHT: 63.8 IN

## 2024-05-17 DIAGNOSIS — Z12.31 ENCOUNTER FOR SCREENING MAMMOGRAM FOR MALIGNANT NEOPLASM OF BREAST: ICD-10-CM

## 2024-05-17 DIAGNOSIS — Z00.00 ANNUAL PHYSICAL EXAM: Primary | ICD-10-CM

## 2024-05-17 DIAGNOSIS — D04.9 SQUAMOUS CELL CARCINOMA IN SITU (SCCIS) OF SKIN: ICD-10-CM

## 2024-05-17 DIAGNOSIS — Z12.83 SKIN CANCER SCREENING: ICD-10-CM

## 2024-05-17 DIAGNOSIS — R92.30 DENSE BREAST: ICD-10-CM

## 2024-05-17 DIAGNOSIS — I47.10 SVT (SUPRAVENTRICULAR TACHYCARDIA) (HCC): ICD-10-CM

## 2024-05-17 DIAGNOSIS — Z80.8 FAMILY HISTORY OF MELANOMA: ICD-10-CM

## 2024-05-17 PROCEDURE — 99396 PREV VISIT EST AGE 40-64: CPT | Performed by: INTERNAL MEDICINE

## 2024-05-17 NOTE — PATIENT INSTRUCTIONS
- You were seen in clinic for regular annual check-up.  We did review your most recent set of blood work with overall good control of your cholesterol levels.    Lets keep up with good nutrition, exercise as able    -Your next colonoscopy will be due in 2025  - You are up-to-date on your mammogram  - Please continue following up with Dr. Escobedo for usual skin examinations  - You are up-to-date on all of your vaccines  - Please continue to eat a varied diet including recommended servings of vegetables, fruits, and low fat dairy. Minimize high saturated fats (such as fast foods) and high sugar intake (such as soda)  - We recommend 150 minutes of moderate intensity exercise (brisk walking, swimming) weekly to maintain your current weight.  Targeted weight loss will require more vigorous exercise or more than 150 minutes/week.    Return to clinic in 1 year for annual physical examination

## 2024-05-17 NOTE — H&P
Yajaira Harding is a 64 year old female.    HPI:     Chief Complaint   Patient presents with    Physical     Annual physical     Ms. 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 Escobedo. Overall doing well.     Came back from [place] for 2 weeks. Saw the pyramids, tombs.     Sleep overall is pretty good. 7-8 hours. Bed in 11 and up 7 am. Nocturia x 1. She drinks.    I reviewed past medical history, surgical history, family history, social history and updated as below    SocHX  - Home: self; safe at home   - Work: Retired RN; [employer] - RN practice administrator x 14 years  - Sexual Activity: none  - Hobbies: reading, puzzles, walking, exercise; grand-daughter.   - Nutrition: healthy eater, fresh fruit/veggies, not a lot of meat (chicken, turkey). - hamburger once in a while, crackers-PBJ.   - Physical Activity: every day - HIIT; 7x a week - strength, cardio.    HISTORY:  Past Medical History:    Allergic rhinitis    Paroxysmal SVT (supraventricular tachycardia) (HCC)    Clinically resolved, underwent cardiac ablation at [facility] in .    SCC (squamous cell carcinoma)    left posterior ankle      Past Surgical History:   Procedure Laterality Date    Adenoidectomy      When I was a child    Colonoscopy      Hysterectomy      partial; cervix removed, ovaries are in place bilaterally      3/21/90    10/14/86    Other      Cardiac ablation     Other surgical history  2001    Cardiac Ablation    Skin surgery  24    MOHS for Squamous Cell Carcinoma    Tonsillectomy      When I was a child    [place] teeth removed      High school age      Family History   Problem Relation Age of Onset    Cancer Mother         Melanoma of scalp    Diabetes Mother     Hypertension Mother     Obesity Mother     Dementia Father         Parkinson's disease     Other (Bullous pemphigoid) Sister         Was in burn unit in [place]    Hypertension Sister     Diabetes Sister     Obesity Sister     Stroke Sister     Other (Other) Sister         RA and SLE    Cancer Maternal Grandfather         Basal cell carcinoma lips and nose    Heart Disorder Maternal Grandfather       Social History:   Social History     Socioeconomic History    Marital status:    Tobacco Use    Smoking status: Never     Passive exposure: Never    Smokeless tobacco: Never   Vaping Use    Vaping status: Never Used   Substance and Sexual Activity    Alcohol use: Yes     Alcohol/week: 4.0 standard drinks of alcohol     Types: 4 Glasses of wine per week     Comment: 2-4 drinks per week    Drug use: Never   Other Topics Concern    Grew up on a farm No    History of tanning Yes    Outdoor occupation No    Breast feeding No    Reaction to local anesthetic No    Pt has a pacemaker No    Pt has a defibrillator No     Social Determinants of Health      [facility]    Social Connections    [facility]    Housing Stability        Medications (Active prior to today's visit):  Current Outpatient Medications   Medication Sig Dispense Refill    Adapalene (DIFFERIN) 0.1 % External Gel Use at bedtime to spots on nose and around eyes 45 g 1    Cholecalciferol (VITAMIN D) 50 MCG (2000 UT) Oral Cap Take by mouth.         Allergies:  No Known Allergies      ROS:   Positive Findings indicated in BOLD    Constitutional: Fever, Chills, Weight Gain, Weight Loss, Night Sweats, Fatigue, Malaise  ENT/Mouth:  Hearing Changes, Ear Pain, Nasal Congestion, Sinus Pain, Hoarseness, Sore throat, Rhinorrhea, Swallowing Difficulty  Eyes: Eye Pain, Swelling, Redness, Foreign Body, Discharge, Vision Changes  Cardiovascular: Chest Pain, SOB, PND, Dyspnea on Exertion, Orthopnea, Claudication, Edema,  Palpitations  Respiratory: Cough, Sputum, Wheezing, Shortness of breath  Gastrointestinal: Nausea, Vomiting, Diarrhea, Constipation, Pain, Heartburn, Dysphagia, Bloody stools, Tarry stools  Genitourinary: Dysmenorrhea, Dysuria, Urinary Frequency, Hematuria, Urinary Incontinence, Urgency,  Flank Pain  Musculoskeletal: Arthralgias, Myalgias, Joint Swelling, Joint Stiffness, Back Pain, Neck Pain  Integumentary: Skin Lesions, Pruritis, Hair Changes, Jaundice, Nail changes  Neuro: Weakness, Numbness, Paresthesias, Loss of Consciousness, Syncope, Dizziness, Headache, Falls  Psych: Anxiety, Depression, Insomnia, Suicidal Ideation, Homicidal ideation, Memory Changes  Heme/Lymph: Bruising, Bleeding, Lymphadenopathy  Endocrine: Polyuria, Polydipsia, Temperature Intolerance    PHYSICAL EXAM:   Vital Signs:  Blood pressure 104/62, pulse 68, temperature 97.8 °F (36.6 °C), temperature source Oral, height 5' 3.8\" (1.621 m), weight 152 lb 6.4 oz (69.1 kg), SpO2 98%.     Constitutional: No acute distress. Alert and oriented x 3.  Eyes: EOMI, PERRLA, clear sclera b/l  HENT: NCAT, Moist mucous membranes, Oropharynx without erythema or exudates  Neck: No JVD, no thyromegaly  Cardiovascular: S1, S2, no S3, no S4, Regular rate and rhythm, No murmurs/gallops/rubs.   Vascular: Equal pulses 2+ carotids without bruits nor thrills/radial bilaterally/DP/PT bilaterally  Respiratory: Clear to auscultation bilaterally.  No wheezes/rales/rhonchi  Gastrointestinal: Soft, nontender, nondistended. Positive bowel sounds x 4. No rebound tenderness. No hepatomegaly, No splenomegaly  Genitourinary: No CVA tenderness bilaterally  Neurologic: No focal neurological deficits, CN II-XII intact, light touch intact, MSK Strength 5/5 and symmetric in all extremities, normal gait  Musculoskeletal: Full range of motion of all extremities, no clubbing/swelling/edema  + Third digit of right hand lipomatous nodule along with Heberden node in the lateral  aspect.  Skin: No lesions, No erythema, no jaundice, Cap Refill < 2s  Psychiatric: Appropriate mood and affect  Heme/Lymph/Immune: No cervical/axillary/inguinal LAD    Breast exam  -Inspection: No suspicious skin abnormalities  -Palpation: No nodules palpated on bilateral exam  -No axillary lymphadenopathy bilaterally    DATA REVIEWED   Labs:  Recent Results (from the past 8760 hour(s))   CBC W/ DIFFERENTIAL    Collection Time: 05/07/24  9:33 AM   Result Value Ref Range    WBC 4.4 4.0 - 11.0 x10(3) uL    RBC 4.01 3.80 - 5.30 x10(6)uL    HGB 13.2 12.0 - 16.0 g/dL    HCT 39.1 35.0 - 48.0 %    .0 150.0 - 450.0 10(3)uL    MCV 97.5 80.0 - 100.0 fL    MCH 32.9 26.0 - 34.0 pg    MCHC 33.8 31.0 - 37.0 g/dL    RDW 12.1 %    Neutrophil Absolute Prelim 2.80 1.50 - 7.70 x10 (3) uL    Neutrophil Absolute 2.80 1.50 - 7.70 x10(3) uL    Lymphocyte Absolute 1.10 1.00 - 4.00 x10(3) uL    Monocyte Absolute 0.42 0.10 - 1.00 x10(3) uL    Eosinophil Absolute 0.03 0.00 - 0.70 x10(3) uL    Basophil Absolute 0.03 0.00 - 0.20 x10(3) uL    Immature Granulocyte Absolute 0.00 0.00 - 1.00 x10(3) uL    Neutrophil % 63.9 %    Lymphocyte % 25.1 %    Monocyte % 9.6 %    Eosinophil % 0.7 %    Basophil % 0.7 %    Immature Granulocyte % 0.0 %     *Note: Due to a large number of results and/or encounters for the requested time period, some results have not been displayed. A complete set of results can be found in Results Review.       Recent Results (from the past 8760 hour(s))   Comp Metabolic Panel (14) [E]    Collection Time: 05/07/24  9:33 AM   Result Value Ref Range    Glucose 93 70 - 99 mg/dL    Sodium 137 136 - 145 mmol/L    Potassium 4.0 3.5 - 5.1 mmol/L    Chloride 103 98 - 112 mmol/L    CO2 28.0 21.0 - 32.0 mmol/L    Anion Gap 6 0 - 18 mmol/L    BUN 8 (L) 9 - 23 mg/dL    Creatinine 0.73 0.55 - 1.02 mg/dL    Calcium, Total 8.8 8.5 - 10.1 mg/dL    Calculated Osmolality 282 275 - 295 mOsm/kg    eGFR-Cr 92 >=60 mL/min/1.73m2    AST 16 15 -  37 U/L    ALT 19 13 - 56 U/L    Alkaline Phosphatase 48 (L) 50 - 130 U/L    Bilirubin, Total 0.5 0.1 - 2.0 mg/dL    Total Protein 6.9 6.4 - 8.2 g/dL    Albumin 4.0 3.4 - 5.0 g/dL    Globulin  2.9 2.8 - 4.4 g/dL    A/G Ratio 1.4 1.0 - 2.0    Patient Fasting for CMP? Yes      *Note: Due to a large number of results and/or encounters for the requested time period, some results have not been displayed. A complete set of results can be found in Results Review.         Imaging:  DEXA scan 10/1/2020  The patient does not report a history of relevant fractures or steroid   use.    Today’s DEXA justifies labelling the patient as having Normal Bone Density   BMD (based on the T score result(s) for the femoral neck, lumbar spine and   total hip.    Using the patient Questionnaire and the FRAX web site, the calculated 10   year risk for any major osteoporotic fracture is 6.6% and the 10 year risk   for a hip fracture is 0.3%.    The patient is not a clear candidate for prescription drug therapy for low   bone mass based on today’s results and the current NOF Treatment   Guidelines.    Mammogram 2/22/2024    Impression   CONCLUSION:   No mammographic evidence for malignancy.  As long as the patient's clinical breast exam remains unchanged, annual screening mammogram is recommended.        BI-RADS CATEGORY:    DIAGNOSTIC CATEGORY 1--NEGATIVE ASSESSMENT.           Pathology:  Pap smear she had any medications for her also all appropriate HTN 2009  FINAL CYTOLOGIC DIAGNOSIS  GYNE THIN PREP - SCREENING  Adequacy:  Satisfactory for evaluation.    General Category:  Negative for intraepithelial lesion or malignancy.            ASSESSMENT/PLAN:       History of SVT  Ablation 2001 without recurrence.  Remains asymptomatic  -Continue to monitor, remains stable    Family history of skin cancer  -Continue follow-up with dermatologist, Dr. Escobedo - referral provided  - Has recovered from her Mohs surgery in 2023    Right hand third digit  nodule  Suspect lipomatous change.  Patient still with full functional mobility of hands.  -She will let us know if it becomes bothersome for her and we can refer for possible excision    Hyperlipidemia  Very mild elevation of total cholesterol 203 due to   - Continue with optimizing nutrition, maintaining adequate exercise activities           Orders This Visit:  No orders of the defined types were placed in this encounter.      Meds This Visit:  Requested Prescriptions      No prescriptions requested or ordered in this encounter       Imaging & Referrals:  [facility] - INTERNAL  [facility] 2D+3D SCREENING BILAT (CPT=77067/96022)       Health Maintenance  HTN Screen: At goal  DM Screen: As above  HLD Screen: As above  Osteoporosis Screen (>65 or < 65 with FRAX > 9.3%): Reviewed as above  HCV Screen: Negative on prior lab draw  HIV Screen: considered low risk  G/C/Syphilis: Considered low risk    Colon Cancer Screening (50-70): Due 2025  Breast Cancer Screening (50-70): Up-to-date  Cervical Cancer Screening (21-64): Partial hysterectomy with removal of the cervix in 2007.  No longer indicated  Lung Cancer Screening (55-79 with 30 p/year and active < 15 years): Not indicated    Influenza: Annually - utd  Td/Tdap: Last Tdap - 2022  Zoster (60+): Up-to-date  HPV (19-26): Not indicated  Pneumococcal: Due at age 65, reports previously obtaining Pneumovax in the past.    Immunization History   Administered Date(s) Administered    Covid-19 Vaccine Pfizer 30 mcg/0.3 ml 03/11/2021, 04/01/2021, 12/03/2021    Covid-19 Vaccine Pfizer Bivalent 30mcg/0.3mL 10/31/2022    FLULAVAL 6 months & older 0.5 ml Prefilled syringe (61327) 11/08/2021    FLUZONE 6 months and older PFS 0.5 ml (47050) 10/17/2019, 09/17/2020, 11/08/2021, 10/31/2022, 11/04/2023    HEP A 10/17/2014, 04/24/2015    Influenza 10/01/2016, 11/07/2017, 10/24/2018, 10/31/2022    Pfizer Covid-19 Vaccine 30mcg/0.3ml 12yrs+ (8124-2755) 11/04/2023    TDAP 10/02/2012,  01/17/2023    Zoster Vaccine Recombinant Adjuvanted (Shingrix) 09/17/2020, 11/17/2020         Kodak Langston MD, 05/17/24, 8:08 AM

## 2024-12-21 ENCOUNTER — OFFICE VISIT (OUTPATIENT)
Dept: DERMATOLOGY CLINIC | Facility: CLINIC | Age: 64
End: 2024-12-21
Payer: COMMERCIAL

## 2024-12-21 DIAGNOSIS — L81.4 LENTIGO: ICD-10-CM

## 2024-12-21 DIAGNOSIS — D22.9 MULTIPLE NEVI: ICD-10-CM

## 2024-12-21 DIAGNOSIS — D23.9 BENIGN NEOPLASM OF SKIN, UNSPECIFIED LOCATION: ICD-10-CM

## 2024-12-21 DIAGNOSIS — L57.0 AK (ACTINIC KERATOSIS): ICD-10-CM

## 2024-12-21 DIAGNOSIS — L72.0 MILIA: ICD-10-CM

## 2024-12-21 DIAGNOSIS — D48.5 NEOPLASM OF UNCERTAIN BEHAVIOR OF SKIN: Primary | ICD-10-CM

## 2024-12-21 DIAGNOSIS — Z85.828 ENCOUNTER FOR FOLLOW-UP SURVEILLANCE OF SKIN CANCER: ICD-10-CM

## 2024-12-21 DIAGNOSIS — Z08 ENCOUNTER FOR FOLLOW-UP SURVEILLANCE OF SKIN CANCER: ICD-10-CM

## 2024-12-21 DIAGNOSIS — L82.1 SK (SEBORRHEIC KERATOSIS): ICD-10-CM

## 2024-12-21 PROCEDURE — 17000 DESTRUCT PREMALG LESION: CPT | Performed by: DERMATOLOGY

## 2024-12-21 PROCEDURE — 11102 TANGNTL BX SKIN SINGLE LES: CPT | Performed by: DERMATOLOGY

## 2024-12-21 PROCEDURE — 99213 OFFICE O/P EST LOW 20 MIN: CPT | Performed by: DERMATOLOGY

## 2024-12-21 PROCEDURE — 17003 DESTRUCT PREMALG LES 2-14: CPT | Performed by: DERMATOLOGY

## 2024-12-21 PROCEDURE — 88305 TISSUE EXAM BY PATHOLOGIST: CPT | Performed by: DERMATOLOGY

## 2024-12-24 NOTE — PROGRESS NOTES
Yajaira Harding is a 64 year old female.  HPI:     CC:    Chief Complaint   Patient presents with    Upper Body Exam     LOV 3/22/24. Pt presents for UBSE. Has c/o red spot on R lower leg. Notes, the spot hasn't gone away. Personal Hx of Ak's and SCC.         Allergies:  Patient has no known allergies.    HISTORY:    Past Medical History:    Allergic rhinitis    Paroxysmal SVT (supraventricular tachycardia) (HCC)    Clinically resolved, underwent cardiac ablation at [facility] in .    SCC (squamous cell carcinoma)    left posterior ankle      Past Surgical History:   Procedure Laterality Date    Adenoidectomy      When I was a child    Colonoscopy      Hysterectomy      partial; cervix removed, ovaries are in place bilaterally      3/21/90    10/14/86    Other      Cardiac ablation     Other surgical history      Cardiac Ablation    Skin surgery  24    MOHS for Squamous Cell Carcinoma    Tonsillectomy      When I was a child    [place] teeth removed      High school age      Family History   Problem Relation Age of Onset    Cancer Mother         Melanoma of scalp    Diabetes Mother     Hypertension Mother     Obesity Mother     Dementia Father         Parkinson's disease    Other (Bullous pemphigoid) Sister         Was in burn unit in [place]    Hypertension Sister     Diabetes Sister     Obesity Sister     Stroke Sister     Other (Other) Sister         RA and SLE    Cancer Maternal Grandfather         Basal cell carcinoma lips and nose    Heart Disorder Maternal Grandfather       Social History     Socioeconomic History    Marital status:    Tobacco Use    Smoking status: Never     Passive exposure: Never    Smokeless tobacco: Never   Vaping Use    Vaping status: Never Used   Substance and Sexual Activity    Alcohol use: Yes     Alcohol/week: 4.0 standard drinks of alcohol     Types: 4 Glasses of wine per week     Comment: 2-4 drinks per week    Drug use: Never   Other  Topics Concern    Grew up on a farm No    History of tanning Yes    Outdoor occupation No    Breast feeding No    Reaction to local anesthetic No    Pt has a pacemaker No    Pt has a defibrillator No     Social Drivers of Health      [facility]    Social Connections    [facility] from [facility]    Housing Stability        Current Outpatient Medications   Medication Sig Dispense Refill    Adapalene (DIFFERIN) 0.1 % External Gel Use at bedtime to spots on nose and around eyes 45 g 1    Cholecalciferol (VITAMIN D) 50 MCG (2000) Oral Cap Take by mouth.      Imiquimod 5 % External Cream Apply topically 2 times every week to affected area(s). 12 each 0     Allergies:   No Known Allergies    Past Medical History:    Allergic rhinitis    Paroxysmal SVT (supraventricular tachycardia) (HCC)    Clinically resolved, underwent cardiac ablation at [facility] in .    SCC (squamous cell carcinoma)    left posterior ankle     Past Surgical History:   Procedure Laterality Date    Adenoidectomy      When I was a child    Colonoscopy      Hysterectomy      partial; cervix removed, ovaries are in place bilaterally      3/21/90    10/14/86    Other      Cardiac ablation     Other surgical history      Cardiac Ablation    Skin surgery  24    MOHS for Squamous Cell Carcinoma    Tonsillectomy      When I was a child    [place] teeth removed      High school age     Social History     Socioeconomic History    Marital status:      Spouse name: Not on file    Number of children: Not on file    Years of education: Not on file    Highest education level: Not on file   Occupational History    Not on file   Tobacco Use    Smoking status: Never     Passive exposure: Never    Smokeless tobacco: Never   Vaping Use    Vaping status: Never Used   Substance and Sexual Activity    Alcohol  use: Yes     Alcohol/week: 4.0 standard drinks of alcohol     Types: 4 Glasses of wine per week     Comment: 2-4 drinks per week    Drug use: Never    Sexual activity: Not on file   Other Topics Concern    Grew up on a farm No    History of tanning Yes    Outdoor occupation No    Breast feeding No    Reaction to local anesthetic No    Pt has a pacemaker No    Pt has a defibrillator No   Social History Narrative    Not on file     Social Drivers of Health     Financial Resource Strain: Not on file   Food Insecurity: Not on file   Transportation Needs: Not on file   Physical Activity: Not on file   Stress: Not on file   Social Connections: Unknown (3/10/2021)    [facility], [facility]    Social Connections     Conversations with friends/family/neighbors per week: Not on file   Housing Stability: Low Risk  (7/7/2021)    Received from [facility], [facility]    Housing Stability     Mortgage Payment Concerns?: Not on file     Number of Places Lived in the Last Year: Not on file     Unstable Housing?: Not on file     Family History   Problem Relation Age of Onset    Cancer Mother         Melanoma of scalp    Diabetes Mother     Hypertension Mother     Obesity Mother     Dementia Father         Parkinson's disease    Other (Bullous pemphigoid) Sister         Was in burn unit in [place]    Hypertension Sister     Diabetes Sister     Obesity Sister     Stroke Sister     Other (Other) Sister         RA and SLE    Cancer Maternal Grandfather         Basal cell carcinoma lips and nose    Heart Disorder Maternal Grandfather        There were no vitals filed for this visit.    HPI:    Chief Complaint   Patient presents with    Upper Body Exam     LOV 3/22/24. Pt presents for UBSE. Has c/o red spot on R lower leg. Notes, the spot hasn't gone away. Personal Hx of Ak's and SCC.       Follow-up patient with history of AK's excessive sun  exposure, sun damage.  Recent SCC family history of melanoma in her mother, grandfather history BCC patient with lots of sun exposure in the past lived in [place].     Recently returned from a trip to [place] with her son  Monitoring lesion at right upper arm    Patient presents with concerns above.    Patient has been in their usual state of health.      History, medications, allergies reviewed as noted.      ROS:  new relevant systemic complaints as noted       Physical Examination:     Well-developed well-nourished patient alert oriented in no acute distress.  Exam total-body performed, including scalp, head, neck, face,nails, hair, external eyes, including conjunctival mucosa, eyelids, lips external ears, back, chest,/ breasts, axillae,  abdomen, arms, legs, palms.     Multiple light to medium brown, well marginated, uniformly pigmented, macules and papules 6 mm and less scattered on exam. pigmented lesions examined with dermoscopy benign-appearing patterns.     Waxy tannish keratotic papules scattered, cherry-red vascular papules scattered.    See map today's date for lesions noted .      Otherwise remarkable for lesions as noted on map.  See details of examination  See Assessment /Plan for additional history and physical exam also:    Assessment / plan:    Orders Placed This Encounter   Procedures    Specimen to Pathology, Tissue [[facility] Pt to [facility]]       Meds & Refills for this Visit:  Requested Prescriptions      No prescriptions requested or ordered in this encounter         Encounter Diagnosis   Name Primary?    Neoplasm of uncertain behavior of skin Yes       See details on map.      Remarkable for:          Patient with history of SCC new lesion red growing at right posterior mid calf 7mm erythematous nodule r/o SCC,   Shave/ tangential biopsy performed, operative note and consent in chart further plans pending pathology    (Pathology-AK inflamed, recommend  imiquimod see instructions, Rx sent)    Extensive keratoses reassurance.    Adapalene gel for milia more numerous lesions over the central face     left posterior ankle, shave biopsy:  -Well-differentiated squamous cell carcinoma 8/23 no recurrence      Nevus versus SK =monitor        Continue monitoring actinic keratoses arms hands    Erythematous scaling keratotic papules noted at sites noted on map  Actinic Keratoses.  Precancerous nature discussed. Sun protection, sunscreen/ blocks encouraged Lesions treated with cryo- .  Biopsy if not resolved.    x2  Actinic Keratoses.  Precancerous nature discussed. Sun protection, sunscreen/ blocks encouraged .  Monitoring for new lesions.  Sun damage additional recurrent and new actinic keratoses, skin cancers may occur in areas of prior actinic keratoses, related to past sun exposure to minimize current sun exposure.  Sunscreen applied consistently regularly, reapplication and sun protection while driving recommended.      Numerous lentigines generalized.  Lesions over the face as well as neck chest arms.  Discussed retinol, sunscreen.    Extensive lentigines, moderate sun damage  Waxy tan keratotic papules lesions in areas of concern as noted reassurance given.  Benign nature discussed.  Possibility of cryo, alphahydroxy acids over-the-counter retinol's discussed.    Benign nevi multiple nevi no other atypical appearing lesions at this time  Medium brown macule at right lateral arm superior lesion on map observed.  Benign pattern on dermoscopy borders regular.  There is slight variation in central pigment network we will recheck in 6 months.  Smaller lesion benign  Given family history of melanoma, nonmelanoma skin cancer careful follow-up, regularly every 6 months  Exam otherwise unchanged    Recheck 6 months or as needed    Please refer to map for specific lesions.  See additional diagnoses.  Pros cons of various therapies, risks benefits discussed.Pathophysiology  discussed with patient.  Therapeutic options reviewed.  See  Medications in grid.  Instructions reviewed at length.    Benign nevi, seborrheic  keratoses, cherry angiomas:  Reassurance regarding other benign skin lesions.Signs and symptoms of skin cancer, ABCDE's of melanoma discussed with patient. Sunscreen use, sun protection, self exams reviewed.  Followup as noted RTC routine checkup 6 mos - one year or p.r.n.    Encounter Times Including precharting, reviewing chart, prior notes obtaining history: 10 minutes, medical exam :10 minutes, notes on body map, plan, counseling 10minutes My total time spent caring for the patient on the day of the encounter: 30 minutes     The patient indicates understanding of these issues and agrees to the plan.  The patient is asked to return as noted in follow-up/ above.    This note was generated using Dragon voice recognition software.  Please contact me regarding any confusion resulting from errors in recognition.

## 2024-12-24 NOTE — PATIENT INSTRUCTIONS
Biopsy shows actinic keratosis.  Recommend imiquimod to this area:    Application instructions for imiquimod.    This comes in small packets. Each packet is designed to cover an area the size of the face/ scalp ( yes, this tiny packet) .  Do not use more than 1 packet per application.  Although the instructions say to discard once open, the medication is stable for 1-2 weeks if the packet is in a sealed plastic sandwich bag or taped.    You may experience redness, crusting and irritation.  If more medication than necessary is used, you may experience flu like symptoms as well.

## 2024-12-24 NOTE — PROGRESS NOTES
Operative Report                     Shave/  Tangential biopsy     Clinical diagnosis:    Size of lesion:    Location:Patient with history of SCC new lesion red growing at right posterior mid calf 7mm erythematous nodule r/o SCC,     Procedure:    With patient in appropriate position the skin of the above was scrubbed with alcohol.  Anesthesia was obtained with 1% Xylocaine with epinephrine.  The skin surrounding the lesion was placed under tension and the lesion was incised using a #15 scalpel blade.  The specimen was sent for histopathologic exam.    Hemostasis was obtained with electrocautery/aluminum chloride.  Estimated blood loss less than 2 cc.    Biopsy dressed with Polysporin, bandage.    Pressure dressing:   No    Complications: None    Written instructions given and reviewed with patient    Await pathology    Contact information reviewed.    Procedural physician:  Mary Escobedo MD

## 2024-12-24 NOTE — PROGRESS NOTES
The pathology report from last visit showed  , right posterior mid calf, shave biopsy:  -Actinic keratosis, inflamed  No further surgery for this site recommend follow-up in 4 months  Once healed would recommend treating base with imiquimod twice weekly for 6 weeks Rx sent.  Please proceed with authorization if needed please review instructions, expectations with patient.  Included in patient instructions from visit  Please log in test results.  Please call patient and inform of results and recommendations.  (Added to history).  Pt to  rtc 3-4 mos or prn.

## 2024-12-27 ENCOUNTER — TELEPHONE (OUTPATIENT)
Dept: DERMATOLOGY CLINIC | Facility: CLINIC | Age: 64
End: 2024-12-27

## 2024-12-27 NOTE — TELEPHONE ENCOUNTER
Patient states that she is returning a call from this morning 12-27-24 regarding her test results.

## 2025-02-03 ENCOUNTER — PATIENT MESSAGE (OUTPATIENT)
Dept: INTERNAL MEDICINE CLINIC | Facility: CLINIC | Age: 65
End: 2025-02-03

## 2025-02-03 DIAGNOSIS — Z80.8 FAMILY HISTORY OF SKIN CANCER: Primary | ICD-10-CM

## 2025-04-02 ENCOUNTER — HOSPITAL ENCOUNTER (OUTPATIENT)
Dept: MAMMOGRAPHY | Facility: HOSPITAL | Age: 65
Discharge: HOME | End: 2025-04-02
Attending: INTERNAL MEDICINE
Payer: COMMERCIAL

## 2025-04-02 DIAGNOSIS — Z12.31 ENCOUNTER FOR SCREENING MAMMOGRAM FOR MALIGNANT NEOPLASM OF BREAST: ICD-10-CM

## 2025-04-02 DIAGNOSIS — R92.30 DENSE BREAST: ICD-10-CM

## 2025-04-02 PROCEDURE — 77063 BREAST TOMOSYNTHESIS BI: CPT | Performed by: INTERNAL MEDICINE

## 2025-04-02 PROCEDURE — 77067 SCR MAMMO BI INCL CAD: CPT | Performed by: INTERNAL MEDICINE

## 2025-04-04 ENCOUNTER — TELEPHONE (OUTPATIENT)
Dept: INTERNAL MEDICINE CLINIC | Facility: CLINIC | Age: 65
End: 2025-04-04

## 2025-04-04 NOTE — TELEPHONE ENCOUNTER
Please notify the patient that the mammogram came back normal.  Breast exam and mammogram recommended for 1 year

## 2025-04-14 ENCOUNTER — OFFICE VISIT (OUTPATIENT)
Dept: DERMATOLOGY CLINIC | Facility: CLINIC | Age: 65
End: 2025-04-14
Payer: COMMERCIAL

## 2025-04-14 DIAGNOSIS — Z85.828 ENCOUNTER FOR FOLLOW-UP SURVEILLANCE OF SKIN CANCER: ICD-10-CM

## 2025-04-14 DIAGNOSIS — D48.5 NEOPLASM OF UNCERTAIN BEHAVIOR OF SKIN: Primary | ICD-10-CM

## 2025-04-14 DIAGNOSIS — D23.9 BENIGN NEOPLASM OF SKIN, UNSPECIFIED LOCATION: ICD-10-CM

## 2025-04-14 DIAGNOSIS — L72.0 MILIA: ICD-10-CM

## 2025-04-14 DIAGNOSIS — Z08 ENCOUNTER FOR FOLLOW-UP SURVEILLANCE OF SKIN CANCER: ICD-10-CM

## 2025-04-14 DIAGNOSIS — L82.1 SK (SEBORRHEIC KERATOSIS): ICD-10-CM

## 2025-04-14 DIAGNOSIS — L57.0 AK (ACTINIC KERATOSIS): ICD-10-CM

## 2025-04-14 DIAGNOSIS — D22.9 MULTIPLE NEVI: ICD-10-CM

## 2025-04-14 DIAGNOSIS — L81.4 LENTIGO: ICD-10-CM

## 2025-04-14 PROCEDURE — 11102 TANGNTL BX SKIN SINGLE LES: CPT | Performed by: DERMATOLOGY

## 2025-04-14 PROCEDURE — 88305 TISSUE EXAM BY PATHOLOGIST: CPT | Performed by: DERMATOLOGY

## 2025-04-14 PROCEDURE — 99213 OFFICE O/P EST LOW 20 MIN: CPT | Performed by: DERMATOLOGY

## 2025-04-14 PROCEDURE — 11103 TANGNTL BX SKIN EA SEP/ADDL: CPT | Performed by: DERMATOLOGY

## 2025-04-14 NOTE — PROGRESS NOTES
The following individual(s) verbally consented to be recorded using ambient AI listening technology and understand that they can each withdraw their consent to this listening technology at any point by asking the clinician to turn off or pause the recording:    Patient name: Yajaira Harding  Additional names:  N/A

## 2025-04-18 NOTE — TELEPHONE ENCOUNTER
Patient active on mychart  IPG message sent to patient with normal results:    Preston Gates,  Dr Langston wanted to let you know that your mammogram came back normal. You will be due for a breast exam and mammogram again in a year. Please let us know if you have any further questions or concerns.  Thank you   [facility] Clinical Team, [facility], [place] P:[phone] F:[phone]

## 2025-04-20 NOTE — PROGRESS NOTES
The pathology report from last visit showed  A.  Skin, right lateral upper arm superior, shave biopsy:  - Compound lentiginous nevus.  No further treatment needed-     B.  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  This should have a wide excision I would recommend excision with Dr. Chanel if patient agreeable she seen him previously or Dr. Traore's office.  This would be outpatient under local anesthesia. alternative would be plastic surgery with Dr. NIEVES    Please log in test results.  Please call patient and inform of results and recommendations.  (Added to history).  Pt to  rtc 4 mos    or prn.

## 2025-04-20 NOTE — PROGRESS NOTES
Operative Report                     Shave/  Tangential biopsy     Clinical diagnosis:    Size of lesion: A: right lateral upper arm-superior 5x7mm tan brown irregular macule r/o atypical pigmented lesion B: right upper arm( inferior lesion): 4x5mm tan brown irregular macule r/o atypical pigmented lesion     Location:    Procedure:    With patient in appropriate position the skin of the above was scrubbed with alcohol.  Anesthesia was obtained with 1% Xylocaine with epinephrine.  The skin surrounding the lesion was placed under tension and the lesion was incised using a #15 scalpel blade.  The specimen was sent for histopathologic exam.    Hemostasis was obtained with electrocautery/aluminum chloride.  Estimated blood loss less than 2 cc.    Biopsy dressed with Polysporin, bandage.    Pressure dressing:   No    Complications: None    Written instructions given and reviewed with patient    Await pathology    Contact information reviewed.    Procedural physician:  Mary Escobedo MD

## 2025-04-20 NOTE — PROGRESS NOTES
Yajaira Harding is a 65 year old female.  HPI:     CC:    Chief Complaint   Patient presents with    Full Skin Exam     LOV 24- Pt presents for a Full Body Skin Exam. Patient is following up to AK treated with imiquimod on the right posterior mid calf, for 6 weeks twice a week. Patient states there isn't much difference following tx course. New lesion on the Right ear helix x several weeks. Has improved in appearance but was raised and tender. New lesions x few months (red bumps near biopsy site). Pt denies any pain or itching.  Personal Hx of SCC and Aks. Family Hx of MM (mother), BCC (Maternal grandfather).          Allergies:  Patient has no known allergies.    HISTORY:    Past Medical History:    Actinic keratosis    Right posterior mid calf    Allergic rhinitis    Atypical nevus of right upper arm    mod-severely dysplastic nevus right arm ( inferior lsion, superior lesion biopsed compound lent nevus)    Paroxysmal SVT (supraventricular tachycardia) (HCC)    Clinically resolved, underwent cardiac ablation at [facility] in .    SCC (squamous cell carcinoma)    left posterior ankle      Past Surgical History:   Procedure Laterality Date    Adenoidectomy      When I was a child    Colonoscopy      Hysterectomy      partial; cervix removed, ovaries are in place bilaterally      3/21/90    10/14/86    Other      Cardiac ablation     Other surgical history      Cardiac Ablation    Skin surgery  24    MOHS for Squamous Cell Carcinoma    Tonsillectomy      When I was a child    [place] teeth removed      High school age      Family History   Problem Relation Age of Onset    Cancer Mother         Melanoma of scalp    Diabetes Mother     Hypertension Mother     Obesity Mother     Dementia Father         Parkinson's disease    Other (Bullous pemphigoid) Sister         Was in burn unit in [place]    Hypertension Sister     Diabetes Sister     Obesity Sister     Stroke Sister      Other (Other) Sister         RA and SLE    Cancer Maternal Grandfather         Basal cell carcinoma lips and nose    Heart Disorder Maternal Grandfather     Breast Cancer Neg     Ovarian Cancer Neg     Prostate Cancer Neg     Pancreatic Cancer Neg     Colon Cancer Neg     Uterine Cancer Neg       Social History     Socioeconomic History    Marital status:    Tobacco Use    Smoking status: Never     Passive exposure: Never    Smokeless tobacco: Never   Vaping Use    Vaping status: Never Used   Substance and Sexual Activity    Alcohol use: Yes     Alcohol/week: 4.0 standard drinks of alcohol     Types: 4 Glasses of wine per week     Comment: 2-4 drinks per week    Drug use: Never   Other Topics Concern    Grew up on a farm No    History of tanning Yes    Outdoor occupation No    Breast feeding No    Reaction to local anesthetic No    Pt has a pacemaker No    Pt has a defibrillator No     Social Drivers of Health      Received from [facility]    Housing Stability        Current Outpatient Medications   Medication Sig Dispense Refill    Adapalene (DIFFERIN) 0.1 % External Gel Use at bedtime to spots on nose and around eyes 45 g 1    Cholecalciferol (VITAMIN D) 50 MCG (2000 UT) Oral Cap Take by mouth.      Imiquimod 5 % External Cream Apply topically 2 times every week to affected area(s). 12 each 0     Allergies:   No Known Allergies    Past Medical History:    Actinic keratosis    Right posterior mid calf    Allergic rhinitis    Atypical nevus of right upper arm    mod-severely dysplastic nevus right arm ( inferior lsion, superior lesion biopsed compound lent nevus)    Paroxysmal SVT (supraventricular tachycardia) (HCC)    Clinically resolved, underwent cardiac ablation at [facility] in 2001.    SCC (squamous cell carcinoma)    left posterior ankle     Past Surgical History:   Procedure Laterality Date    Adenoidectomy      When I was a child    Colonoscopy      Hysterectomy   2007    partial; cervix removed, ovaries are in place bilaterally      3/21/90    10/14/86    Other      Cardiac ablation     Other surgical history  2001    Cardiac Ablation    Skin surgery  24    MOHS for Squamous Cell Carcinoma    Tonsillectomy      When I was a child    [place] teeth removed      High school age     Social History     Socioeconomic History    Marital status:      Spouse name: Not on file    Number of children: Not on file    Years of education: Not on file    Highest education level: Not on file   Occupational History    Not on file   Tobacco Use    Smoking status: Never     Passive exposure: Never    Smokeless tobacco: Never   Vaping Use    Vaping status: Never Used   Substance and Sexual Activity    Alcohol use: Yes     Alcohol/week: 4.0 standard drinks of alcohol     Types: 4 Glasses of wine per week     Comment: 2-4 drinks per week    Drug use: Never    Sexual activity: Not on file   Other Topics Concern    Grew up on a farm No    History of tanning Yes    Outdoor occupation No    Breast feeding No    Reaction to local anesthetic No    Pt has a pacemaker No    Pt has a defibrillator No   Social History Narrative    Not on file     Social Drivers of Health     Food Insecurity: Not on file   Transportation Needs: Not on file   Stress: Not on file   Housing Stability: Low Risk  (2021)    Received from [facility]    Housing Stability     Mortgage Payment Concerns?: Not on file     Number of Places Lived in the Last Year: Not on file     Unstable Housing?: Not on file     Family History   Problem Relation Age of Onset    Cancer Mother         Melanoma of scalp    Diabetes Mother     Hypertension Mother     Obesity Mother     Dementia Father         Parkinson's disease    Other (Bullous pemphigoid) Sister         Was in burn unit in [place]    Hypertension Sister     Diabetes Sister     Obesity Sister     Stroke Sister     Other (Other) Sister          RA and SLE    Cancer Maternal Grandfather         Basal cell carcinoma lips and nose    Heart Disorder Maternal Grandfather     Breast Cancer Neg     Ovarian Cancer Neg     Prostate Cancer Neg     Pancreatic Cancer Neg     Colon Cancer Neg     Uterine Cancer Neg        There were no vitals filed for this visit.    HPI:    Chief Complaint   Patient presents with    Full Skin Exam     LOV 12/21/24- Pt presents for a Full Body Skin Exam. Patient is following up to AK treated with imiquimod on the right posterior mid calf, for 6 weeks twice a week. Patient states there isn't much difference following tx course. New lesion on the Right ear helix x several weeks. Has improved in appearance but was raised and tender. New lesions x few months (red bumps near biopsy site). Pt denies any pain or itching.  Personal Hx of SCC and Aks. Family Hx of MM (mother), BCC (Maternal grandfather).        Follow-up patient with history of AK's excessive sun exposure, sun damage.  Recent SCC family history of melanoma in her mother, grandfather history BCC patient with lots of sun exposure in the past lived in [place] for many years in [facility].     Recently returned from a trip to [place] with her son  Monitoring lesion at right upper arm    History of Present Illness  The patient presents with concerns about skin lesions and bumps.    She has a bump on her ear that appeared a couple of months ago. Initially, it was larger, tender, and felt scaly, but it has since decreased in size and is no longer tender. She is unsure if it is a mole as she cannot see it well.    She mentions having multiple small bumps on her skin, which she describes as 'weird little bumps' that are not fun. Her skin is 'bad' and 'crusty' in some areas, but these spots are not tender.    She inquires about a red spot near her armpit, which she thought might be a mole.    She expresses concern about several skin lesions with varying colors and mentions  that some have changed over time. She recalls that some lesions were previously photographed and monitored.    She mentions a previous biopsy site on her leg, which has a purple discoloration that she is not concerned about as it is not tender and does not affect her ability to work out.    She mentions not wanting to end up like her mother, implying a family history of skin issues or concerns.    She plans to travel to [facility] in October and visit her son in [place] to assist him with research.      Patient presents with concerns above.    Patient has been in their usual state of health.      History, medications, allergies reviewed as noted.      ROS:  new relevant systemic complaints as noted       Physical Examination:     Well-developed well-nourished patient alert oriented in no acute distress.  Exam total-body performed, including scalp, head, neck, face,nails, hair, external eyes, including conjunctival mucosa, eyelids, lips external ears, back, chest,/ breasts, axillae,  abdomen, arms, legs, palms.     Multiple light to medium brown, well marginated, uniformly pigmented, macules and papules 6 mm and less scattered on exam. pigmented lesions examined with dermoscopy benign-appearing patterns.     Waxy tannish keratotic papules scattered, cherry-red vascular papules scattered.    See map today's date for lesions noted .      Otherwise remarkable for lesions as noted on map.  See details of examination  See Assessment /Plan for additional history and physical exam also:    Assessment / plan:    Orders Placed This Encounter   Procedures    Specimen to Pathology, Tissue [[facility] Pt to [facility] lab]       Meds & Refills for this Visit:  Requested Prescriptions      No prescriptions requested or ordered in this encounter         Encounter Diagnoses   Name Primary?    Neoplasm of uncertain behavior of skin Yes    AK (actinic keratosis)     SK (seborrheic keratosis)     Milia     Lentigo     Benign neoplasm of  skin, unspecified location     Multiple nevi     Encounter for follow-up surveillance of skin cancer        See details on map.      Remarkable for:    Physical Exam  SKIN: Inclusion cyst on ear, scaly, non-tender. Keratosis near armpit, non-tender. Pigmented lesion on lower arm with tail and hazy edges. Darker lesion on upper arm.    Results        Assessment & Plan  Pigmented skin lesions  Multiple pigmented skin lesions with varying colors and some with hazy pigmentation on the edges. Changes in the lower lesions and a darker appearance in the upper arm lesion. Family history of skin issues. Preference to avoid potential serious conditions.  - Remove the lower pigmented lesions due to changes.  - Monitor the upper arm lesion and other areas for further changes.    Inclusion cyst  Bump in the ear crease, initially larger, tender, and scaly, now reduced in size and non-tender. Assessed as an inclusion cyst, likely due to friction and irritation. Not precancerous.    Keratosis  Red spot near the armpit, identified as keratosis. Not of concern at this time.                       A: right lateral upper arm-superior 5x7mm tan brown irregular macule r/o atypical pigmented lesion B: right upper arm( inferior lesion): 4x5mm tan brown irregular macule r/o atypical pigmented lesion   Shave/ tangential biopsy performed, operative note and consent in chart further plans pending pathology.  X2      1224 posterior ankle AK biopsy healed well    (Pathology-AK inflamed, recommend imiquimod see instructions, Rx sent)    Extensive keratoses reassurance.    Adapalene gel for milia more numerous lesions over the central face     left posterior ankle, shave biopsy:  -Well-differentiated squamous cell carcinoma 8/23 no recurrence      Nevus versus SK =monitor        Continue monitoring actinic keratoses arms hands     Keratoses.  Precancerous nature discussed. Sun protection, sunscreen/ blocks encouraged .  Monitoring for new lesions.   Sun damage additional recurrent and new actinic keratoses, skin cancers may occur in areas of prior actinic keratoses, related to past sun exposure to minimize current sun exposure.  Sunscreen applied consistently regularly, reapplication and sun protection while driving recommended.      Numerous lentigines generalized.  Lesions over the face as well as neck chest arms.  Discussed retinol, sunscreen.    Extensive lentigines, moderate sun damage  Waxy tan keratotic papules lesions in areas of concern as noted reassurance given.  Benign nature discussed.  Possibility of cryo, alphahydroxy acids over-the-counter retinol's discussed.    Benign nevi multiple nevi no other atypical appearing lesions at this time  Medium brown macule at right lateral arm superior lesion on map observed.  Benign pattern on dermoscopy borders regular.  There is slight variation in central pigment network we will recheck in 6 months.  Smaller lesion benign  Given family history of melanoma, nonmelanoma skin cancer careful follow-up, regularly every 6 months  Exam otherwise unchanged    Recheck 6 months or as needed    Please refer to map for specific lesions.  See additional diagnoses.  Pros cons of various therapies, risks benefits discussed.Pathophysiology discussed with patient.  Therapeutic options reviewed.  See  Medications in grid.  Instructions reviewed at length.    Benign nevi, seborrheic  keratoses, cherry angiomas:  Reassurance regarding other benign skin lesions.Signs and symptoms of skin cancer, ABCDE's of melanoma discussed with patient. Sunscreen use, sun protection, self exams reviewed.  Followup as noted RTC routine checkup 6 mos - one year or p.r.n.    Encounter Times Including precharting, reviewing chart, prior notes obtaining history: 10 minutes, medical exam :10 minutes, notes on body map, plan, counseling 10minutes My total time spent caring for the patient on the day of the encounter: 30 minutes     The patient  indicates understanding of these issues and agrees to the plan.  The patient is asked to return as noted in follow-up/ above.    This note was generated using Dragon voice recognition software.  Please contact me regarding any confusion resulting from errors in recognition.

## 2025-04-21 ENCOUNTER — PATIENT MESSAGE (OUTPATIENT)
Dept: INTERNAL MEDICINE CLINIC | Facility: CLINIC | Age: 65
End: 2025-04-21

## 2025-04-21 NOTE — PROGRESS NOTES
Patient informed of test results and all KMT's recommendations. Voiced understanding. Pt will check who is in her network and let us know. Path book updated

## 2025-05-28 ENCOUNTER — PATIENT MESSAGE (OUTPATIENT)
Dept: INTERNAL MEDICINE CLINIC | Facility: CLINIC | Age: 65
End: 2025-05-28

## 2025-05-28 DIAGNOSIS — E55.9 VITAMIN D DEFICIENCY: ICD-10-CM

## 2025-05-28 DIAGNOSIS — Z00.00 ANNUAL PHYSICAL EXAM: Primary | ICD-10-CM

## 2025-05-29 ENCOUNTER — LAB ENCOUNTER (OUTPATIENT)
Dept: LAB | Age: 65
End: 2025-05-29
Attending: INTERNAL MEDICINE
Payer: COMMERCIAL

## 2025-05-29 DIAGNOSIS — E55.9 VITAMIN D DEFICIENCY: ICD-10-CM

## 2025-05-29 DIAGNOSIS — Z00.00 ANNUAL PHYSICAL EXAM: ICD-10-CM

## 2025-05-29 LAB
ALBUMIN SERPL-MCNC: 4.5 G/DL (ref 3.2–4.8)
ALBUMIN/GLOB SERPL: 2 {RATIO} (ref 1–2)
ALP LIVER SERPL-CCNC: 41 U/L (ref 50–130)
ALT SERPL-CCNC: 13 U/L (ref 10–49)
ANION GAP SERPL CALC-SCNC: 11 MMOL/L (ref 0–18)
AST SERPL-CCNC: 21 U/L (ref ?–34)
BASOPHILS # BLD AUTO: 0.03 X10(3) UL (ref 0–0.2)
BASOPHILS NFR BLD AUTO: 0.7 %
BILIRUB SERPL-MCNC: 0.5 MG/DL (ref 0.2–1.1)
BUN BLD-MCNC: 7 MG/DL (ref 9–23)
CALCIUM BLD-MCNC: 9.3 MG/DL (ref 8.7–10.6)
CHLORIDE SERPL-SCNC: 101 MMOL/L (ref 98–112)
CHOLEST SERPL-MCNC: 182 MG/DL (ref ?–200)
CO2 SERPL-SCNC: 24 MMOL/L (ref 21–32)
CREAT BLD-MCNC: 0.72 MG/DL (ref 0.55–1.02)
EGFRCR SERPLBLD CKD-EPI 2021: 93 ML/MIN/1.73M2 (ref 60–?)
EOSINOPHIL # BLD AUTO: 0.01 X10(3) UL (ref 0–0.7)
EOSINOPHIL NFR BLD AUTO: 0.2 %
ERYTHROCYTE [DISTWIDTH] IN BLOOD BY AUTOMATED COUNT: 11.9 %
FASTING PATIENT LIPID ANSWER: YES
FASTING STATUS PATIENT QL REPORTED: YES
GLOBULIN PLAS-MCNC: 2.2 G/DL (ref 2–3.5)
GLUCOSE BLD-MCNC: 91 MG/DL (ref 70–99)
HCT VFR BLD AUTO: 38.3 % (ref 35–48)
HDLC SERPL-MCNC: 89 MG/DL (ref 40–59)
HGB BLD-MCNC: 13 G/DL (ref 12–16)
IMM GRANULOCYTES # BLD AUTO: 0.01 X10(3) UL (ref 0–1)
IMM GRANULOCYTES NFR BLD: 0.2 %
LDLC SERPL CALC-MCNC: 83 MG/DL (ref ?–100)
LYMPHOCYTES # BLD AUTO: 1.05 X10(3) UL (ref 1–4)
LYMPHOCYTES NFR BLD AUTO: 23.2 %
MCH RBC QN AUTO: 33 PG (ref 26–34)
MCHC RBC AUTO-ENTMCNC: 33.9 G/DL (ref 31–37)
MCV RBC AUTO: 97.2 FL (ref 80–100)
MONOCYTES # BLD AUTO: 0.45 X10(3) UL (ref 0.1–1)
MONOCYTES NFR BLD AUTO: 10 %
NEUTROPHILS # BLD AUTO: 2.97 X10 (3) UL (ref 1.5–7.7)
NEUTROPHILS # BLD AUTO: 2.97 X10(3) UL (ref 1.5–7.7)
NEUTROPHILS NFR BLD AUTO: 65.7 %
NONHDLC SERPL-MCNC: 93 MG/DL (ref ?–130)
OSMOLALITY SERPL CALC.SUM OF ELEC: 280 MOSM/KG (ref 275–295)
PLATELET # BLD AUTO: 229 10(3)UL (ref 150–450)
POTASSIUM SERPL-SCNC: 3.9 MMOL/L (ref 3.5–5.1)
PROT SERPL-MCNC: 6.7 G/DL (ref 5.7–8.2)
RBC # BLD AUTO: 3.94 X10(6)UL (ref 3.8–5.3)
SODIUM SERPL-SCNC: 136 MMOL/L (ref 136–145)
TRIGL SERPL-MCNC: 50 MG/DL (ref 30–149)
TSI SER-ACNC: 1.54 UIU/ML (ref 0.55–4.78)
VIT D+METAB SERPL-MCNC: 38.8 NG/ML (ref 30–100)
VLDLC SERPL CALC-MCNC: 8 MG/DL (ref 0–30)
WBC # BLD AUTO: 4.5 X10(3) UL (ref 4–11)

## 2025-05-29 PROCEDURE — 82306 VITAMIN D 25 HYDROXY: CPT

## 2025-05-29 PROCEDURE — 85025 COMPLETE CBC W/AUTO DIFF WBC: CPT

## 2025-05-29 PROCEDURE — 84443 ASSAY THYROID STIM HORMONE: CPT

## 2025-05-29 PROCEDURE — 80053 COMPREHEN METABOLIC PANEL: CPT

## 2025-05-29 PROCEDURE — 36415 COLL VENOUS BLD VENIPUNCTURE: CPT

## 2025-05-29 PROCEDURE — 80061 LIPID PANEL: CPT

## 2025-05-29 NOTE — TELEPHONE ENCOUNTER
Thank you for pending labs, I have signed them and sent a [facility] message.  Hopefully can obtain tomorrow

## 2025-05-30 ENCOUNTER — OFFICE VISIT (OUTPATIENT)
Dept: INTERNAL MEDICINE CLINIC | Facility: CLINIC | Age: 65
End: 2025-05-30
Payer: COMMERCIAL

## 2025-05-30 VITALS
TEMPERATURE: 97 F | SYSTOLIC BLOOD PRESSURE: 120 MMHG | HEIGHT: 63 IN | DIASTOLIC BLOOD PRESSURE: 72 MMHG | OXYGEN SATURATION: 97 % | BODY MASS INDEX: 28.35 KG/M2 | WEIGHT: 160 LBS | HEART RATE: 72 BPM

## 2025-05-30 DIAGNOSIS — D23.9 DYSPLASTIC NEVUS: ICD-10-CM

## 2025-05-30 DIAGNOSIS — Z12.4 SCREENING FOR CERVICAL CANCER: ICD-10-CM

## 2025-05-30 DIAGNOSIS — Z80.8 FAMILY HISTORY OF MELANOMA: ICD-10-CM

## 2025-05-30 DIAGNOSIS — R92.30 DENSE BREAST: ICD-10-CM

## 2025-05-30 DIAGNOSIS — Z12.31 ENCOUNTER FOR SCREENING MAMMOGRAM FOR MALIGNANT NEOPLASM OF BREAST: ICD-10-CM

## 2025-05-30 DIAGNOSIS — Z00.00 ANNUAL PHYSICAL EXAM: Primary | ICD-10-CM

## 2025-05-30 DIAGNOSIS — I47.10 SVT (SUPRAVENTRICULAR TACHYCARDIA) (HCC): ICD-10-CM

## 2025-05-30 DIAGNOSIS — Z13.0 SCREENING FOR DEFICIENCY ANEMIA: ICD-10-CM

## 2025-05-30 PROCEDURE — 99396 PREV VISIT EST AGE 40-64: CPT | Performed by: INTERNAL MEDICINE

## 2025-05-30 NOTE — PATIENT INSTRUCTIONS
- You were seen in clinic for regular annual check-up.  We did review your most recent set of blood work with overall good control of your cholesterol levels.  This has improved from the last check    Lets keep up with good nutrition, exercise as able    -Your next colonoscopy will be due in 2026.  Monitor for any changes of abnormal bowel habits.  - You are up-to-date on your mammogram.  Will place an order for your mammogram next year  - Please continue following up with Dr. Escobedo for usual skin examinations    We may need further surgical excision, needs to find a plastic surgeon within network such as Dr. Chanel or Dr. Traore    It is reasonable to discontinue Pap smears moving forward.  Monitor for any unusual vaginal or genitourinary bleeding.    - You are up-to-date on all of your vaccines.  Okay to obtain the Prevnar 20 pneumonia shot at age 65 next year  - Please continue to eat a varied diet including recommended servings of vegetables, fruits, and low fat dairy. Minimize high saturated fats (such as fast foods) and high sugar intake (such as soda)  - We recommend 150 minutes of moderate intensity exercise (brisk walking, swimming) weekly to maintain your current weight.  Targeted weight loss will require more vigorous exercise or more than 150 minutes/week.    Return to clinic in 1 year for annual physical examination

## 2025-05-30 NOTE — H&P
Yajaira Harding is a 65 year old female.    HPI:     No chief complaint on file.    Ms. 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     Sleep is 7-8 hours. Sometimes 1x nocturia.    She is retired, she did move and more involved in the     OB/GYN  - Postmenopausal    I reviewed past medical history, surgical history, family history, social history and updated as below    SocHX  - Home: self; safe at home   - Work: Retired RN; [employer] - RN practice administrator x 14 years -- [phone],  - Hobbies: reading, puzzles, walking, exercise; 8-9 grand-daughter spending time with her. Roller skating/pool time.  - Nutrition:     Breakfast - coffee, fruit, protein bars, peanut butter and toast  Lunch - veggies, salad, proteins sometimes  Dinner  - veggies and rice, cheese and crackers  Lots of water  Some snacks. Salad.    - Physical Activity: every day - HIIT; 7x a week - strength, cardio. Core. Lots walking     HISTORY:  Past Medical History:    Actinic keratosis    Right posterior mid calf    Allergic rhinitis    Atypical nevus of right upper arm    mod-severely dysplastic nevus right arm ( inferior lsion, superior lesion biopsed compound lent nevus)    Paroxysmal SVT (supraventricular tachycardia) (HCC)    Clinically resolved, underwent cardiac ablation at [facility] in .    SCC (squamous cell carcinoma)    left posterior ankle      Past Surgical History:   Procedure Laterality Date    Adenoidectomy      When I was a child    Colonoscopy      Hysterectomy      partial; cervix removed, ovaries are in place bilaterally      3/21/90    10/14/86    Other      Cardiac ablation     Other surgical history      Cardiac Ablation    Skin surgery  24    MOHS for Squamous Cell Carcinoma    Tonsillectomy      When I was a child    [place] teeth removed      High  school age      Family History   Problem Relation Age of Onset    Cancer Mother         Melanoma of scalp    Diabetes Mother     Hypertension Mother     Obesity Mother     Dementia Father         Parkinson's disease    Other (Bullous pemphigoid) Sister         Was in burn unit in [place]    Hypertension Sister     Diabetes Sister     Obesity Sister     Stroke Sister     Other (Other) Sister         RA and SLE    Cancer Maternal Grandfather         Basal cell carcinoma lips and nose    Heart Disorder Maternal Grandfather     Breast Cancer Neg     Ovarian Cancer Neg     Prostate Cancer Neg     Pancreatic Cancer Neg     Colon Cancer Neg     Uterine Cancer Neg       Social History:   Social History     Socioeconomic History    Marital status:    Tobacco Use    Smoking status: Never     Passive exposure: Never    Smokeless tobacco: Never   Vaping Use    Vaping status: Never Used   Substance and Sexual Activity    Alcohol use: Yes     Alcohol/week: 4.0 standard drinks of alcohol     Types: 4 Glasses of wine per week     Comment: 2-4 drinks per week    Drug use: Never   Other Topics Concern    Grew up on a farm No    History of tanning Yes    Outdoor occupation No    Breast feeding No    Reaction to local anesthetic No    Pt has a pacemaker No    Pt has a defibrillator No     Social Drivers of Health      [facility]    Housing Stability        Medications (Active prior to today's visit):  Current Outpatient Medications   Medication Sig Dispense Refill    Imiquimod 5 % External Cream Apply topically 2 times every week to affected area(s). 12 each 0    Adapalene (DIFFERIN) 0.1 % External Gel Use at bedtime to spots on nose and around eyes 45 g 1    Cholecalciferol (VITAMIN D) 50 MCG (2000 UT) Oral Cap Take by mouth.         Allergies:  No Known Allergies      ROS:   Positive Findings indicated in BOLD    Constitutional: Fever, Chills, Weight Gain, Weight Loss, Night Sweats,  Fatigue, Malaise  ENT/Mouth:  Hearing Changes, Ear Pain, Nasal Congestion, Sinus Pain, Hoarseness, Sore throat, Rhinorrhea, Swallowing Difficulty  Eyes: Eye Pain, Swelling, Redness, Foreign Body, Discharge, Vision Changes  Cardiovascular: Chest Pain, SOB, PND, Dyspnea on Exertion, Orthopnea, Claudication, Edema, Palpitations  Respiratory: Cough, Sputum, Wheezing, Shortness of breath  Gastrointestinal: Nausea, Vomiting, Diarrhea, Constipation, Pain, Heartburn, Dysphagia, Bloody stools, Tarry stools  Genitourinary: Dysmenorrhea, Dysuria, Urinary Frequency, Hematuria, Urinary Incontinence, Urgency,  Flank Pain  Musculoskeletal: Arthralgias, Myalgias, Joint Swelling, Joint Stiffness, Back Pain, Neck Pain  Integumentary: Skin Lesions, Pruritis, Hair Changes, Jaundice, Nail changes  Neuro: Weakness, Numbness, Paresthesias, Loss of Consciousness, Syncope, Dizziness, Headache, Falls  Psych: Anxiety, Depression, Insomnia, Suicidal Ideation, Homicidal ideation, Memory Changes  Heme/Lymph: Bruising, Bleeding, Lymphadenopathy  Endocrine: Polyuria, Polydipsia, Temperature Intolerance    PHYSICAL EXAM:   Vital Signs:  There were no vitals taken for this visit.     Constitutional: No acute distress. Alert and oriented x 3.  Eyes: EOMI, PERRLA, clear sclera b/l  HENT: NCAT, Moist mucous membranes, Oropharynx without erythema or exudates  Neck: No JVD, no thyromegaly  Cardiovascular: S1, S2, no S3, no S4, Regular rate and rhythm, No murmurs/gallops/rubs.   Vascular: Equal pulses 2+ carotids without bruits nor thrills/radial bilaterally/DP/PT bilaterally  Respiratory: Clear to auscultation bilaterally.  No wheezes/rales/rhonchi  Gastrointestinal: Soft, nontender, nondistended. Positive bowel sounds x 4. No rebound tenderness. No hepatomegaly, No splenomegaly  Genitourinary: No CVA tenderness bilaterally  Neurologic: No focal neurological deficits, CN II-XII intact, light touch intact, MSK Strength 5/5 and symmetric in all  extremities, normal gait  Musculoskeletal: Full range of motion of all extremities, no clubbing/swelling/edema  + Third digit of right hand lipomatous nodule along with [place]ber[place] node in the lateral aspect.  Skin: No lesions, No erythema, no jaundice, Cap Refill < 2s  Psychiatric: Appropriate mood and affect  Heme/Lymph/Immune: No cervical/axillary/inguinal LAD    Breast exam  -Inspection: No suspicious skin abnormalities  -Palpation: No nodules palpated on bilateral exam  -No axillary lymphadenopathy bilaterally    DATA REVIEWED   Labs:  Recent Results (from the past 8760 hours)   CBC With Differential With Platelet    Collection Time: 05/29/25 12:10 PM   Result Value Ref Range    WBC 4.5 4.0 - 11.0 x10(3) uL    RBC 3.94 3.80 - 5.30 x10(6)uL    HGB 13.0 12.0 - 16.0 g/dL    HCT 38.3 35.0 - 48.0 %    .0 150.0 - 450.0 10(3)uL    MCV 97.2 80.0 - 100.0 fL    MCH 33.0 26.0 - 34.0 pg    MCHC 33.9 31.0 - 37.0 g/dL    RDW 11.9 %    Neutrophil Absolute Prelim 2.97 1.50 - 7.70 x10 (3) uL    Neutrophil Absolute 2.97 1.50 - 7.70 x10(3) uL    Lymphocyte Absolute 1.05 1.00 - 4.00 x10(3) uL    Monocyte Absolute 0.45 0.10 - 1.00 x10(3) uL    Eosinophil Absolute 0.01 0.00 - 0.70 x10(3) uL    Basophil Absolute 0.03 0.00 - 0.20 x10(3) uL    Immature Granulocyte Absolute 0.01 0.00 - 1.00 x10(3) uL    Neutrophil % 65.7 %    Lymphocyte % 23.2 %    Monocyte % 10.0 %    Eosinophil % 0.2 %    Basophil % 0.7 %    Immature Granulocyte % 0.2 %     *Note: Due to a large number of results and/or encounters for the requested time period, some results have not been displayed. A complete set of results can be found in Results Review.       Recent Results (from the past 8760 hours)   Comp Metabolic Panel (14)    Collection Time: 05/29/25 12:10 PM   Result Value Ref Range    Glucose 91 70 - 99 mg/dL    Sodium 136 136 - 145 mmol/L    Potassium 3.9 3.5 - 5.1 mmol/L    Chloride 101 98 - 112 mmol/L    CO2 24.0 21.0 - 32.0 mmol/L    Anion Gap 11 0 -  18 mmol/L    BUN 7 (L) 9 - 23 mg/dL    Creatinine 0.72 0.55 - 1.02 mg/dL    Calcium, Total 9.3 8.7 - 10.6 mg/dL    Calculated Osmolality 280 275 - 295 mOsm/kg    eGFR-Cr 93 >=60 mL/min/1.73m2     Comment: eGFR calculated using the CKD-EPI 2021 calculation    AST 21 <34 U/L    ALT 13 10 - 49 U/L    Alkaline Phosphatase 41 (L) 50 - 130 U/L    Bilirubin, Total 0.5 0.2 - 1.1 mg/dL    Total Protein 6.7 5.7 - 8.2 g/dL    Albumin 4.5 3.2 - 4.8 g/dL    Globulin  2.2 2.0 - 3.5 g/dL    A/G Ratio 2.0 1.0 - 2.0    Patient Fasting for CMP? Yes      *Note: Due to a large number of results and/or encounters for the requested time period, some results have not been displayed. A complete set of results can be found in Results Review.         Imaging:  DEXA scan 10/1/2020  The patient does not report a history of relevant fractures or steroid   use.    Today’s DEXA justifies labelling the patient as having Normal Bone Density   BMD (based on the T score result(s) for the femoral neck, lumbar spine and   total hip.    Using the patient Questionnaire and the FRAX web site, the calculated 10   year risk for any major osteoporotic fracture is 6.6% and the 10 year risk   for a hip fracture is 0.3%.    The patient is not a clear candidate for prescription drug therapy for low   bone mass based on today’s results and the current NOF Treatment   Guidelines.    Mammogram 4/2/2025    Impression   CONCLUSION:   There is no mammographic evidence of malignancy in either breast. As long as patient's clinical breast exam remains unchanged, annual screening mammogram is recommended.     BI-RADS CATEGORY:    DIAGNOSTIC CATEGORY 1 - NEGATIVE ASSESSMENT.            Pathology:  Pap smear she had any medications for her also all appropriate HTN 2009  FINAL CYTOLOGIC DIAGNOSIS  GYNE THIN PREP - SCREENING  Adequacy:  Satisfactory for evaluation.    General Category:  Negative for intraepithelial lesion or malignancy.        Dermatopathology  4/16/2025  Final Diagnosis:   A.  Skin, right lateral upper arm superior, shave biopsy:  - Compound lentiginous nevus.     B.  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  - See comment.            ASSESSMENT/PLAN:       History of SVT  Ablation 2001 without recurrence.  Remains asymptomatic  -Continue to monitor, remains stable    Family history of skin cancer  -Continue follow-up with dermatologist, Dr. 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  This was of the right upper arm via shave biopsy needs further excision within network provider.    Dr. Traore June 13th - scheduled    Right hand third digit nodule  Suspect lipomatous change.  Patient still with full functional mobility of hands.  -She will let us know if it becomes bothersome for her and we can refer for possible excision    Hyperlipidemia  -Total cholesterol 182, HDL 89  - Continue with optimizing nutrition, maintaining adequate exercise activities           Orders This Visit:  No orders of the defined types were placed in this encounter.      Meds This Visit:  Requested Prescriptions      No prescriptions requested or ordered in this encounter       Imaging & Referrals:  None       Health Maintenance  HTN Screen: At goal  DM Screen: As above  HLD Screen: As above  Osteoporosis Screen (>65 or < 65 with FRAX > 9.3%): Reviewed as above  HCV Screen: Negative on prior lab draw  HIV Screen: considered low risk  G/C/Syphilis: Considered low risk    Colon Cancer Screening (50-70): Due 2025, advised to make an appointment  Breast Cancer Screening (50-70): Up-to-date  Cervical Cancer Screening (21-64): Partial hysterectomy with removal of the cervix in 2007.  No longer indicated  Lung Cancer Screening (55-79 with 30 p/year and active  < 15 years): Not indicated    Influenza: Annually - utd  Td/Tdap: Last Tdap - 2022  Zoster (60+): Up-to-date  HPV (19-26): Not indicated  Pneumococcal: Due at age 65, reports previously obtaining Pneumovax in the past.    Immunization History   Administered Date(s) Administered    Covid-19 Vaccine Pfizer 30 mcg/0.3 ml 03/11/2021, 04/01/2021, 12/03/2021    Covid-19 Vaccine Pfizer Bivalent 30mcg/0.3mL 10/31/2022    FLULAVAL 6 months & older 0.5 ml Prefilled syringe ([id number]) 11/08/2021    FLUZONE 6 months and older PFS 0.5 ml ([id number]) 10/17/2019, 09/17/2020, 11/08/2021, 10/31/2022, 11/04/2023    HEP A 10/17/2014, 04/24/2015    Influenza 10/01/2016, 11/07/2017, 10/24/2018, 10/31/2022    Pfizer Covid-19 Vaccine 30mcg/0.3ml 12yrs+ 11/04/2023, 11/14/2024    TDAP 10/02/2012, 01/17/2023    Zoster Vaccine Recombinant Adjuvanted (Shingrix) 09/17/2020, 11/17/2020       Kodak Langston MD, 05/30/25, 3:34 PM

## 2025-06-13 ENCOUNTER — LAB REQUISITION (OUTPATIENT)
Dept: LAB | Facility: HOSPITAL | Age: 65
End: 2025-06-13
Payer: COMMERCIAL

## 2025-06-13 DIAGNOSIS — D22.61 MELANOCYTIC NEVI OF RIGHT UPPER LIMB, INCLUDING SHOULDER: ICD-10-CM

## 2025-06-13 PROCEDURE — 88305 TISSUE EXAM BY PATHOLOGIST: CPT | Performed by: DERMATOLOGY
